# Patient Record
Sex: FEMALE | Race: WHITE
[De-identification: names, ages, dates, MRNs, and addresses within clinical notes are randomized per-mention and may not be internally consistent; named-entity substitution may affect disease eponyms.]

---

## 2020-06-29 ENCOUNTER — HOSPITAL ENCOUNTER (OUTPATIENT)
Age: 55
End: 2020-06-29
Payer: COMMERCIAL

## 2020-06-29 DIAGNOSIS — M51.37: ICD-10-CM

## 2020-06-29 DIAGNOSIS — M79.7: ICD-10-CM

## 2020-06-29 DIAGNOSIS — M06.4: Primary | ICD-10-CM

## 2020-06-29 LAB
ALANINE AMINOTRANSFER ALT/SGPT: 31 U/L (ref 13–56)
ALBUMIN SERPL-MCNC: 3.8 G/DL (ref 3.2–5)
ALKALINE PHOSPHATASE: 87 U/L (ref 45–117)
ANION GAP: 5 (ref 5–15)
AST(SGOT): 12 U/L (ref 15–37)
BUN SERPL-MCNC: 17 MG/DL (ref 7–18)
BUN/CREAT RATIO: 24.4 RATIO (ref 10–20)
CALCIUM SERPL-MCNC: 8.8 MG/DL (ref 8.5–10.1)
CARBON DIOXIDE: 27 MMOL/L (ref 21–32)
CHLORIDE: 105 MMOL/L (ref 98–107)
DEPRECATED RDW RBC: 46.1 FL (ref 35.1–43.9)
ERYTHROCYTE [DISTWIDTH] IN BLOOD: 13.4 % (ref 11.6–14.6)
EST GLOM FILT RATE - AFR AMER: 112 ML/MIN (ref 60–?)
GLOBULIN: 3.5 G/DL (ref 2.2–4.2)
GLUCOSE: 102 MG/DL (ref 74–106)
HCT VFR BLD AUTO: 45.1 % (ref 37–47)
HEMOGLOBIN: 14.2 G/DL (ref 12–15)
HGB BLD-MCNC: 14.2 G/DL (ref 12–15)
IMMATURE GRANULOCYTES COUNT: 0.04 X10^3/UL (ref 0–0)
MCV RBC: 93.6 FL (ref 81–99)
MEAN CORP HGB CONC: 31.5 G/DL (ref 32–36)
MEAN PLATELET VOL.: 9.6 FL (ref 6.2–12)
NRBC FLAGGED BY ANALYZER: 0 % (ref 0–5)
PLATELET # BLD: 337 K/MM3 (ref 150–450)
PLATELET COUNT: 337 K/MM3 (ref 150–450)
POTASSIUM: 3.9 MMOL/L (ref 3.5–5.1)
RBC # BLD AUTO: 4.82 M/MM3 (ref 4.2–5.4)
RBC DISTRIBUTION WIDTH CV: 13.4 % (ref 11.6–14.6)
RBC DISTRIBUTION WIDTH SD: 46.1 FL (ref 35.1–43.9)
WBC # BLD AUTO: 9.2 K/MM3 (ref 4.4–11)
WHITE BLOOD COUNT: 9.2 K/MM3 (ref 4.4–11)

## 2020-06-29 PROCEDURE — 85025 COMPLETE CBC W/AUTO DIFF WBC: CPT

## 2020-06-29 PROCEDURE — 36415 COLL VENOUS BLD VENIPUNCTURE: CPT

## 2020-06-29 PROCEDURE — 80053 COMPREHEN METABOLIC PANEL: CPT

## 2020-06-29 PROCEDURE — 86803 HEPATITIS C AB TEST: CPT

## 2020-06-29 PROCEDURE — 86200 CCP ANTIBODY: CPT

## 2020-06-29 PROCEDURE — 86706 HEP B SURFACE ANTIBODY: CPT

## 2020-06-29 PROCEDURE — 86705 HEP B CORE ANTIBODY IGM: CPT

## 2020-06-29 PROCEDURE — 87340 HEPATITIS B SURFACE AG IA: CPT

## 2020-06-29 PROCEDURE — 86431 RHEUMATOID FACTOR QUANT: CPT

## 2020-06-29 PROCEDURE — 81374 HLA I TYPING 1 ANTIGEN LR: CPT

## 2020-09-15 ENCOUNTER — HOSPITAL ENCOUNTER (OUTPATIENT)
Age: 55
End: 2020-09-15
Payer: COMMERCIAL

## 2020-09-15 DIAGNOSIS — M06.4: Primary | ICD-10-CM

## 2020-09-15 DIAGNOSIS — M79.7: ICD-10-CM

## 2020-09-15 DIAGNOSIS — M51.37: ICD-10-CM

## 2020-09-15 DIAGNOSIS — Z79.899: ICD-10-CM

## 2020-09-15 LAB
ALANINE AMINOTRANSFER ALT/SGPT: 35 U/L (ref 13–56)
ALBUMIN SERPL-MCNC: 3.7 G/DL (ref 3.2–5)
ALKALINE PHOSPHATASE: 78 U/L (ref 45–117)
ANION GAP: 5 (ref 5–15)
AST(SGOT): 16 U/L (ref 15–37)
BUN SERPL-MCNC: 12 MG/DL (ref 7–18)
BUN/CREAT RATIO: 15.4 RATIO (ref 10–20)
CALCIUM SERPL-MCNC: 8.8 MG/DL (ref 8.5–10.1)
CARBON DIOXIDE: 31 MMOL/L (ref 21–32)
CHLORIDE: 106 MMOL/L (ref 98–107)
DEPRECATED RDW RBC: 44.5 FL (ref 35.1–43.9)
ERYTHROCYTE [DISTWIDTH] IN BLOOD: 13 % (ref 11.6–14.6)
EST GLOM FILT RATE - AFR AMER: 98 ML/MIN (ref 60–?)
GLOBULIN: 3 G/DL (ref 2.2–4.2)
GLUCOSE: 106 MG/DL (ref 74–106)
HCT VFR BLD AUTO: 41 % (ref 37–47)
HEMOGLOBIN: 13 G/DL (ref 12–15)
HGB BLD-MCNC: 13 G/DL (ref 12–15)
IMMATURE GRANULOCYTES COUNT: 0.05 X10^3/UL (ref 0–0)
MCV RBC: 94.5 FL (ref 81–99)
MEAN CORP HGB CONC: 31.7 G/DL (ref 32–36)
MEAN PLATELET VOL.: 10.2 FL (ref 6.2–12)
NRBC FLAGGED BY ANALYZER: 0 % (ref 0–5)
PLATELET # BLD: 351 K/MM3 (ref 150–450)
PLATELET COUNT: 351 K/MM3 (ref 150–450)
POTASSIUM: 3.6 MMOL/L (ref 3.5–5.1)
RBC # BLD AUTO: 4.34 M/MM3 (ref 4.2–5.4)
RBC DISTRIBUTION WIDTH CV: 13 % (ref 11.6–14.6)
RBC DISTRIBUTION WIDTH SD: 44.5 FL (ref 35.1–43.9)
WBC # BLD AUTO: 9 K/MM3 (ref 4.4–11)
WHITE BLOOD COUNT: 9 K/MM3 (ref 4.4–11)

## 2020-09-15 PROCEDURE — 85025 COMPLETE CBC W/AUTO DIFF WBC: CPT

## 2020-09-15 PROCEDURE — 36415 COLL VENOUS BLD VENIPUNCTURE: CPT

## 2020-09-15 PROCEDURE — 80053 COMPREHEN METABOLIC PANEL: CPT

## 2020-10-20 ENCOUNTER — HOSPITAL ENCOUNTER (OUTPATIENT)
Age: 55
End: 2020-10-20
Payer: COMMERCIAL

## 2020-10-20 DIAGNOSIS — M51.37: ICD-10-CM

## 2020-10-20 DIAGNOSIS — M06.4: Primary | ICD-10-CM

## 2020-10-20 DIAGNOSIS — Z79.899: ICD-10-CM

## 2020-10-20 DIAGNOSIS — M79.7: ICD-10-CM

## 2020-10-20 LAB
ALANINE AMINOTRANSFER ALT/SGPT: 33 U/L (ref 13–56)
ALBUMIN SERPL-MCNC: 3.8 G/DL (ref 3.2–5)
ALKALINE PHOSPHATASE: 74 U/L (ref 45–117)
ANION GAP: 5 (ref 5–15)
AST(SGOT): 12 U/L (ref 15–37)
BUN SERPL-MCNC: 16 MG/DL (ref 7–18)
BUN/CREAT RATIO: 19.9 RATIO (ref 10–20)
CALCIUM SERPL-MCNC: 9 MG/DL (ref 8.5–10.1)
CARBON DIOXIDE: 30 MMOL/L (ref 21–32)
CHLORIDE: 103 MMOL/L (ref 98–107)
DEPRECATED RDW RBC: 46.6 FL (ref 35.1–43.9)
ERYTHROCYTE [DISTWIDTH] IN BLOOD: 13.6 % (ref 11.6–14.6)
EST GLOM FILT RATE - AFR AMER: 95 ML/MIN (ref 60–?)
GLOBULIN: 3.3 G/DL (ref 2.2–4.2)
GLUCOSE: 101 MG/DL (ref 74–106)
HCT VFR BLD AUTO: 42.3 % (ref 37–47)
HEMOGLOBIN: 13.6 G/DL (ref 12–15)
HGB BLD-MCNC: 13.6 G/DL (ref 12–15)
IMMATURE GRANULOCYTES COUNT: 0.05 X10^3/UL (ref 0–0)
MCV RBC: 95.1 FL (ref 81–99)
MEAN CORP HGB CONC: 32.2 G/DL (ref 32–36)
MEAN PLATELET VOL.: 9.9 FL (ref 6.2–12)
NRBC FLAGGED BY ANALYZER: 0 % (ref 0–5)
PLATELET # BLD: 366 K/MM3 (ref 150–450)
PLATELET COUNT: 366 K/MM3 (ref 150–450)
POTASSIUM: 3.8 MMOL/L (ref 3.5–5.1)
RBC # BLD AUTO: 4.45 M/MM3 (ref 4.2–5.4)
RBC DISTRIBUTION WIDTH CV: 13.6 % (ref 11.6–14.6)
RBC DISTRIBUTION WIDTH SD: 46.6 FL (ref 35.1–43.9)
WBC # BLD AUTO: 11 K/MM3 (ref 4.4–11)
WHITE BLOOD COUNT: 11 K/MM3 (ref 4.4–11)

## 2020-10-20 PROCEDURE — 80053 COMPREHEN METABOLIC PANEL: CPT

## 2020-10-20 PROCEDURE — 36415 COLL VENOUS BLD VENIPUNCTURE: CPT

## 2020-10-20 PROCEDURE — 85025 COMPLETE CBC W/AUTO DIFF WBC: CPT

## 2020-12-22 ENCOUNTER — HOSPITAL ENCOUNTER (OUTPATIENT)
Age: 55
End: 2020-12-22
Payer: COMMERCIAL

## 2020-12-22 DIAGNOSIS — M51.37: ICD-10-CM

## 2020-12-22 DIAGNOSIS — M06.4: Primary | ICD-10-CM

## 2020-12-22 DIAGNOSIS — Z79.899: ICD-10-CM

## 2020-12-22 DIAGNOSIS — M79.7: ICD-10-CM

## 2020-12-22 LAB
ALANINE AMINOTRANSFER ALT/SGPT: 42 U/L (ref 13–56)
ALBUMIN SERPL-MCNC: 3.9 G/DL (ref 3.2–5)
ALKALINE PHOSPHATASE: 64 U/L (ref 45–117)
ANION GAP: 5 (ref 5–15)
AST(SGOT): 15 U/L (ref 15–37)
BUN SERPL-MCNC: 13 MG/DL (ref 7–18)
BUN/CREAT RATIO: 15 RATIO (ref 10–20)
CALCIUM SERPL-MCNC: 8.8 MG/DL (ref 8.5–10.1)
CARBON DIOXIDE: 30 MMOL/L (ref 21–32)
CHLORIDE: 103 MMOL/L (ref 98–107)
DEPRECATED RDW RBC: 45.5 FL (ref 35.1–43.9)
ERYTHROCYTE [DISTWIDTH] IN BLOOD: 13.3 % (ref 11.6–14.6)
EST GLOM FILT RATE - AFR AMER: 87 ML/MIN (ref 60–?)
GLOBULIN: 3 G/DL (ref 2.2–4.2)
GLUCOSE: 98 MG/DL (ref 74–106)
HCT VFR BLD AUTO: 43.4 % (ref 37–47)
HEMOGLOBIN: 14.1 G/DL (ref 12–15)
HGB BLD-MCNC: 14.1 G/DL (ref 12–15)
IMMATURE GRANULOCYTES COUNT: 0.02 X10^3/UL (ref 0–0)
MCV RBC: 93.5 FL (ref 81–99)
MEAN CORP HGB CONC: 32.5 G/DL (ref 32–36)
MEAN PLATELET VOL.: 10.3 FL (ref 6.2–12)
NRBC FLAGGED BY ANALYZER: 0 % (ref 0–5)
PLATELET # BLD: 302 K/MM3 (ref 150–450)
PLATELET COUNT: 302 K/MM3 (ref 150–450)
POTASSIUM: 3.5 MMOL/L (ref 3.5–5.1)
RBC # BLD AUTO: 4.64 M/MM3 (ref 4.2–5.4)
RBC DISTRIBUTION WIDTH CV: 13.3 % (ref 11.6–14.6)
RBC DISTRIBUTION WIDTH SD: 45.5 FL (ref 35.1–43.9)
WBC # BLD AUTO: 8.2 K/MM3 (ref 4.4–11)
WHITE BLOOD COUNT: 8.2 K/MM3 (ref 4.4–11)

## 2020-12-22 PROCEDURE — 36415 COLL VENOUS BLD VENIPUNCTURE: CPT

## 2020-12-22 PROCEDURE — 80053 COMPREHEN METABOLIC PANEL: CPT

## 2020-12-22 PROCEDURE — 85025 COMPLETE CBC W/AUTO DIFF WBC: CPT

## 2023-05-19 ENCOUNTER — HOSPITAL ENCOUNTER (OUTPATIENT)
Dept: DATA CONVERSION | Facility: HOSPITAL | Age: 58
End: 2023-05-19
Attending: PAIN MEDICINE | Admitting: PAIN MEDICINE
Payer: COMMERCIAL

## 2023-05-19 DIAGNOSIS — M54.16 RADICULOPATHY, LUMBAR REGION: ICD-10-CM

## 2023-05-19 DIAGNOSIS — R52 PAIN, UNSPECIFIED: ICD-10-CM

## 2023-07-05 LAB
ALANINE AMINOTRANSFERASE (SGPT) (U/L) IN SER/PLAS: 29 U/L (ref 7–45)
ALBUMIN (G/DL) IN SER/PLAS: 4.3 G/DL (ref 3.4–5)
ALKALINE PHOSPHATASE (U/L) IN SER/PLAS: 64 U/L (ref 33–110)
ANION GAP IN SER/PLAS: 8 MMOL/L (ref 10–20)
ASPARTATE AMINOTRANSFERASE (SGOT) (U/L) IN SER/PLAS: 20 U/L (ref 9–39)
BASOPHILS (10*3/UL) IN BLOOD BY AUTOMATED COUNT: 0.04 X10E9/L (ref 0–0.1)
BASOPHILS/100 LEUKOCYTES IN BLOOD BY AUTOMATED COUNT: 0.6 % (ref 0–2)
BILIRUBIN TOTAL (MG/DL) IN SER/PLAS: 0.6 MG/DL (ref 0–1.2)
CALCIUM (MG/DL) IN SER/PLAS: 9.4 MG/DL (ref 8.6–10.3)
CARBON DIOXIDE, TOTAL (MMOL/L) IN SER/PLAS: 29 MMOL/L (ref 21–32)
CHLORIDE (MMOL/L) IN SER/PLAS: 107 MMOL/L (ref 98–107)
CHOLESTEROL (MG/DL) IN SER/PLAS: 110 MG/DL (ref 0–199)
CHOLESTEROL IN HDL (MG/DL) IN SER/PLAS: 42 MG/DL
CHOLESTEROL/HDL RATIO: 2.6
CREATININE (MG/DL) IN SER/PLAS: 0.78 MG/DL (ref 0.5–1.05)
EOSINOPHILS (10*3/UL) IN BLOOD BY AUTOMATED COUNT: 0.15 X10E9/L (ref 0–0.7)
EOSINOPHILS/100 LEUKOCYTES IN BLOOD BY AUTOMATED COUNT: 2.3 % (ref 0–6)
ERYTHROCYTE DISTRIBUTION WIDTH (RATIO) BY AUTOMATED COUNT: 12.5 % (ref 11.5–14.5)
ERYTHROCYTE MEAN CORPUSCULAR HEMOGLOBIN CONCENTRATION (G/DL) BY AUTOMATED: 32.6 G/DL (ref 32–36)
ERYTHROCYTE MEAN CORPUSCULAR VOLUME (FL) BY AUTOMATED COUNT: 90 FL (ref 80–100)
ERYTHROCYTES (10*6/UL) IN BLOOD BY AUTOMATED COUNT: 4.71 X10E12/L (ref 4–5.2)
ESTIMATED AVERAGE GLUCOSE FOR HBA1C: 154 MG/DL
GFR FEMALE: 88 ML/MIN/1.73M2
GLUCOSE (MG/DL) IN SER/PLAS: 145 MG/DL (ref 74–99)
HEMATOCRIT (%) IN BLOOD BY AUTOMATED COUNT: 42.3 % (ref 36–46)
HEMOGLOBIN (G/DL) IN BLOOD: 13.8 G/DL (ref 12–16)
HEMOGLOBIN A1C/HEMOGLOBIN TOTAL IN BLOOD: 7 %
IMMATURE GRANULOCYTES/100 LEUKOCYTES IN BLOOD BY AUTOMATED COUNT: 0.2 % (ref 0–0.9)
LDL: 46 MG/DL (ref 0–99)
LEUKOCYTES (10*3/UL) IN BLOOD BY AUTOMATED COUNT: 6.7 X10E9/L (ref 4.4–11.3)
LYMPHOCYTES (10*3/UL) IN BLOOD BY AUTOMATED COUNT: 2.79 X10E9/L (ref 1.2–4.8)
LYMPHOCYTES/100 LEUKOCYTES IN BLOOD BY AUTOMATED COUNT: 41.9 % (ref 13–44)
MONOCYTES (10*3/UL) IN BLOOD BY AUTOMATED COUNT: 0.47 X10E9/L (ref 0.1–1)
MONOCYTES/100 LEUKOCYTES IN BLOOD BY AUTOMATED COUNT: 7.1 % (ref 2–10)
NEUTROPHILS (10*3/UL) IN BLOOD BY AUTOMATED COUNT: 3.2 X10E9/L (ref 1.2–7.7)
NEUTROPHILS/100 LEUKOCYTES IN BLOOD BY AUTOMATED COUNT: 47.9 % (ref 40–80)
PLATELETS (10*3/UL) IN BLOOD AUTOMATED COUNT: 260 X10E9/L (ref 150–450)
POTASSIUM (MMOL/L) IN SER/PLAS: 5.1 MMOL/L (ref 3.5–5.3)
PROTEIN TOTAL: 6.4 G/DL (ref 6.4–8.2)
SODIUM (MMOL/L) IN SER/PLAS: 139 MMOL/L (ref 136–145)
THYROTROPIN (MIU/L) IN SER/PLAS BY DETECTION LIMIT <= 0.05 MIU/L: 1.62 MIU/L (ref 0.44–3.98)
TRIGLYCERIDE (MG/DL) IN SER/PLAS: 109 MG/DL (ref 0–149)
UREA NITROGEN (MG/DL) IN SER/PLAS: 13 MG/DL (ref 6–23)
VLDL: 22 MG/DL (ref 0–40)

## 2023-07-06 ENCOUNTER — TELEPHONE (OUTPATIENT)
Dept: PRIMARY CARE | Facility: CLINIC | Age: 58
End: 2023-07-06

## 2023-07-06 NOTE — TELEPHONE ENCOUNTER
----- Message from Rosales Zamarripa PA-C sent at 7/6/2023  7:08 AM EDT -----  Please let her know her labs are all stable. Look forward to seeing her soon. Thanks!

## 2023-07-07 RX ORDER — METFORMIN HYDROCHLORIDE 500 MG/1
500 TABLET, EXTENDED RELEASE ORAL DAILY
COMMUNITY
End: 2023-07-10 | Stop reason: SDUPTHER

## 2023-07-07 RX ORDER — PREGABALIN 100 MG/1
100 CAPSULE ORAL 2 TIMES DAILY
COMMUNITY
End: 2023-12-14 | Stop reason: SDUPTHER

## 2023-07-07 RX ORDER — PANTOPRAZOLE SODIUM 40 MG/1
40 TABLET, DELAYED RELEASE ORAL DAILY
COMMUNITY
End: 2023-09-12 | Stop reason: SDUPTHER

## 2023-07-07 RX ORDER — ROSUVASTATIN CALCIUM 5 MG/1
5 TABLET, COATED ORAL DAILY
COMMUNITY
End: 2023-07-10 | Stop reason: SDUPTHER

## 2023-07-07 RX ORDER — LANOLIN ALCOHOL/MO/W.PET/CERES
100 CREAM (GRAM) TOPICAL DAILY
COMMUNITY
End: 2023-09-12

## 2023-07-07 RX ORDER — SERTRALINE HYDROCHLORIDE 50 MG/1
50 TABLET, FILM COATED ORAL DAILY
COMMUNITY
End: 2023-09-12 | Stop reason: ALTCHOICE

## 2023-07-07 RX ORDER — SERTRALINE HYDROCHLORIDE 25 MG/1
25 TABLET, FILM COATED ORAL DAILY
COMMUNITY
End: 2023-09-12 | Stop reason: SDUPTHER

## 2023-07-07 RX ORDER — ASCORBIC ACID 125 MG
1 TABLET,CHEWABLE ORAL DAILY
COMMUNITY

## 2023-07-10 ENCOUNTER — OFFICE VISIT (OUTPATIENT)
Dept: PRIMARY CARE | Facility: CLINIC | Age: 58
End: 2023-07-10
Payer: COMMERCIAL

## 2023-07-10 ENCOUNTER — TELEPHONE (OUTPATIENT)
Dept: PRIMARY CARE | Facility: CLINIC | Age: 58
End: 2023-07-10

## 2023-07-10 VITALS
SYSTOLIC BLOOD PRESSURE: 140 MMHG | DIASTOLIC BLOOD PRESSURE: 80 MMHG | WEIGHT: 176 LBS | HEIGHT: 63 IN | HEART RATE: 81 BPM | BODY MASS INDEX: 31.18 KG/M2

## 2023-07-10 DIAGNOSIS — K21.9 GASTROESOPHAGEAL REFLUX DISEASE WITHOUT ESOPHAGITIS: ICD-10-CM

## 2023-07-10 DIAGNOSIS — M54.41 CHRONIC LOW BACK PAIN WITH BILATERAL SCIATICA, UNSPECIFIED BACK PAIN LATERALITY: ICD-10-CM

## 2023-07-10 DIAGNOSIS — G25.81 RESTLESS LEG SYNDROME: ICD-10-CM

## 2023-07-10 DIAGNOSIS — E78.5 HYPERLIPIDEMIA, UNSPECIFIED HYPERLIPIDEMIA TYPE: Primary | ICD-10-CM

## 2023-07-10 DIAGNOSIS — M54.42 CHRONIC LOW BACK PAIN WITH BILATERAL SCIATICA, UNSPECIFIED BACK PAIN LATERALITY: ICD-10-CM

## 2023-07-10 DIAGNOSIS — E11.9 TYPE 2 DIABETES MELLITUS WITHOUT COMPLICATION, UNSPECIFIED WHETHER LONG TERM INSULIN USE (MULTI): ICD-10-CM

## 2023-07-10 DIAGNOSIS — G89.29 CHRONIC LOW BACK PAIN WITH BILATERAL SCIATICA, UNSPECIFIED BACK PAIN LATERALITY: ICD-10-CM

## 2023-07-10 DIAGNOSIS — Z12.31 ENCOUNTER FOR SCREENING MAMMOGRAM FOR BREAST CANCER: ICD-10-CM

## 2023-07-10 LAB — VITAMIN B6: 571.4 NMOL/L (ref 20–125)

## 2023-07-10 PROCEDURE — 99214 OFFICE O/P EST MOD 30 MIN: CPT

## 2023-07-10 PROCEDURE — 3051F HG A1C>EQUAL 7.0%<8.0%: CPT

## 2023-07-10 PROCEDURE — 3079F DIAST BP 80-89 MM HG: CPT

## 2023-07-10 PROCEDURE — 3077F SYST BP >= 140 MM HG: CPT

## 2023-07-10 PROCEDURE — 1036F TOBACCO NON-USER: CPT

## 2023-07-10 PROCEDURE — 3008F BODY MASS INDEX DOCD: CPT

## 2023-07-10 RX ORDER — IBUPROFEN 200 MG
1 CAPSULE ORAL DAILY
COMMUNITY

## 2023-07-10 RX ORDER — ROPINIROLE 0.25 MG/1
TABLET, FILM COATED ORAL
Qty: 255 TABLET | Refills: 0 | Status: SHIPPED | OUTPATIENT
Start: 2023-07-10 | End: 2023-09-12 | Stop reason: SDUPTHER

## 2023-07-10 RX ORDER — ROSUVASTATIN CALCIUM 5 MG/1
5 TABLET, COATED ORAL DAILY
Qty: 90 TABLET | Refills: 3 | Status: SHIPPED | OUTPATIENT
Start: 2023-07-10 | End: 2023-09-12 | Stop reason: SDUPTHER

## 2023-07-10 RX ORDER — METFORMIN HYDROCHLORIDE 500 MG/1
500 TABLET, EXTENDED RELEASE ORAL DAILY
Qty: 90 TABLET | Refills: 3 | Status: SHIPPED | OUTPATIENT
Start: 2023-07-10 | End: 2023-09-12 | Stop reason: SDUPTHER

## 2023-07-10 ASSESSMENT — PATIENT HEALTH QUESTIONNAIRE - PHQ9
2. FEELING DOWN, DEPRESSED OR HOPELESS: NOT AT ALL
SUM OF ALL RESPONSES TO PHQ9 QUESTIONS 1 AND 2: 0
1. LITTLE INTEREST OR PLEASURE IN DOING THINGS: NOT AT ALL

## 2023-07-10 ASSESSMENT — ENCOUNTER SYMPTOMS
RESPIRATORY NEGATIVE: 1
GASTROINTESTINAL NEGATIVE: 1
CARDIOVASCULAR NEGATIVE: 1
CONSTITUTIONAL NEGATIVE: 1

## 2023-07-10 NOTE — PROGRESS NOTES
"Subjective   Patient ID: Hallie Demarco is a 57 y.o. female who presents for pt having issues with leg muscle restlessness at night.    HPI   DM2: A1C 7% 7/5/23. Compliant, no SE. Blood sugar at home has been averaging 130s.  GERD: PPI effective, sometimes breakthrough heartburn. She avoids trigger foods/drinks, HOB is raised. B12 level good 9/9/22.   HYPERLIPIDEMIA: Lipids WNL 7/5/23. Compliant with statin, no SE.  BL HIP/BACK PAIN/BL LE NEUROPATHY SYMPTOMS: Fall on ice 2018, still w/residual pain/symptoms. Following w/Dr. Carr's office, receives injections. Has seen neurosurgeon Dr. Wright and does not think that neuropathy symptoms are from spine, EMG unremarkable June 2023. Dr. Wright thinks possible restless leg. Endorses nightly leg spasms and tingling with some mild burning BL LE, getting into hot back seems to be the only relief.  ANXIETY/DEPRESSION: Compliant w/sertraline 75mg daily. Stable. DANNY score 7/PHQ score 9 today.    Hx of tx by Dr. Faria in [place] ([facility]) for non-RA factor positive RA. Then was told by another rheumatologist in [place] which said she did not have RA.      BP a little elevated today, cannot remember home checks, denies CP/SOB/dizziness.     Dermatology, sees Dejan at [facility].  Hx of cystocele, s/p hysterectomy. Women's health with Dr. Hope.  Hx of vocal cord caner, follows with Dr. Sutherland, riaz.  Mammogram good 2022, due now.    Review of Systems   Constitutional: Negative.    Respiratory: Negative.     Cardiovascular: Negative.    Gastrointestinal: Negative.        Objective   /80   Pulse 81   Ht 1.6 m (5' 3\")   Wt 79.8 kg (176 lb)   BMI 31.18 kg/m²     Physical Exam  Constitutional:       General: She is not in acute distress.     Appearance: Normal appearance. She is not ill-appearing.   HENT:      Head: Normocephalic and atraumatic.   Eyes:      Extraocular Movements: Extraocular movements intact.      Conjunctiva/sclera: Conjunctivae normal. "   Cardiovascular:      Rate and Rhythm: Normal rate.   Pulmonary:      Effort: Pulmonary effort is normal.   Abdominal:      General: There is no distension.   Musculoskeletal:         General: Normal range of motion.      Cervical back: Normal range of motion.   Skin:     General: Skin is warm and dry.   Neurological:      General: No focal deficit present.      Mental Status: She is alert and oriented to person, place, and time.   Psychiatric:         Mood and Affect: Mood normal.         Behavior: Behavior normal.         Thought Content: Thought content normal.         Judgment: Judgment normal.         Assessment/Plan        Advised track daily BP and bring log to follow up.  Start Requip for restless leg, taper up to 1mg nightly, she may call me in 1 month and will increase if not effecitve.  No other medication changes.  Mammogram ordered.  Follow up 2 months or sooner prn.

## 2023-07-10 NOTE — TELEPHONE ENCOUNTER
----- Message from Rosales Zamarripa PA-C sent at 7/10/2023  3:16 PM EDT -----  Please let her know I would like her to stop taking her B12 as well as her B6. I would like to see how she does with the new medication we started today and I will recheck levels at follow up. Thanks!

## 2023-07-20 ENCOUNTER — TELEPHONE (OUTPATIENT)
Dept: PRIMARY CARE | Facility: CLINIC | Age: 58
End: 2023-07-20

## 2023-07-20 DIAGNOSIS — R92.8 ABNORMAL MAMMOGRAM OF LEFT BREAST: Primary | ICD-10-CM

## 2023-07-20 NOTE — TELEPHONE ENCOUNTER
----- Message from Rosales Zamarripa PA-C sent at 7/20/2023  8:43 AM EDT -----  Please let her know there was an abnormal spot in her left breast on mammogram. These are usually benign, but we need to get an US to further characterize what's there. Thanks!

## 2023-07-31 ENCOUNTER — TELEPHONE (OUTPATIENT)
Dept: PRIMARY CARE | Facility: CLINIC | Age: 58
End: 2023-07-31

## 2023-07-31 NOTE — TELEPHONE ENCOUNTER
----- Message from Rosales Zamarripa PA-C sent at 2023  9:21 AM EDT -----  Please let her know her US looks fine, the radiologist would like to have a diagnostic mammogram done in 6 months from now to be completely sure. There is an order already in the system for a diagnostic mammogram which will  24, so she will need to schedule this mammogram before then. Thanks!

## 2023-09-07 VITALS — BODY MASS INDEX: 31.32 KG/M2 | WEIGHT: 170.19 LBS | HEIGHT: 62 IN

## 2023-09-12 ENCOUNTER — LAB (OUTPATIENT)
Dept: LAB | Facility: LAB | Age: 58
End: 2023-09-12
Payer: COMMERCIAL

## 2023-09-12 ENCOUNTER — OFFICE VISIT (OUTPATIENT)
Dept: PRIMARY CARE | Facility: CLINIC | Age: 58
End: 2023-09-12
Payer: COMMERCIAL

## 2023-09-12 VITALS
BODY MASS INDEX: 32.25 KG/M2 | SYSTOLIC BLOOD PRESSURE: 132 MMHG | DIASTOLIC BLOOD PRESSURE: 80 MMHG | HEIGHT: 63 IN | HEART RATE: 59 BPM | WEIGHT: 182 LBS

## 2023-09-12 DIAGNOSIS — G62.9 NEUROPATHY: ICD-10-CM

## 2023-09-12 DIAGNOSIS — R79.89 LOW VITAMIN B12 LEVEL: ICD-10-CM

## 2023-09-12 DIAGNOSIS — E11.9 TYPE 2 DIABETES MELLITUS WITHOUT COMPLICATION, UNSPECIFIED WHETHER LONG TERM INSULIN USE (MULTI): ICD-10-CM

## 2023-09-12 DIAGNOSIS — G25.81 RESTLESS LEG SYNDROME: ICD-10-CM

## 2023-09-12 DIAGNOSIS — R60.9 EDEMA, UNSPECIFIED TYPE: Primary | ICD-10-CM

## 2023-09-12 DIAGNOSIS — F41.9 ANXIETY: ICD-10-CM

## 2023-09-12 DIAGNOSIS — E78.5 HYPERLIPIDEMIA, UNSPECIFIED HYPERLIPIDEMIA TYPE: ICD-10-CM

## 2023-09-12 DIAGNOSIS — K21.9 GASTROESOPHAGEAL REFLUX DISEASE WITHOUT ESOPHAGITIS: ICD-10-CM

## 2023-09-12 LAB — COBALAMIN (VITAMIN B12) (PG/ML) IN SER/PLAS: 525 PG/ML (ref 211–911)

## 2023-09-12 PROCEDURE — 3051F HG A1C>EQUAL 7.0%<8.0%: CPT

## 2023-09-12 PROCEDURE — 84207 ASSAY OF VITAMIN B-6: CPT

## 2023-09-12 PROCEDURE — 82607 VITAMIN B-12: CPT

## 2023-09-12 PROCEDURE — 3075F SYST BP GE 130 - 139MM HG: CPT

## 2023-09-12 PROCEDURE — 1036F TOBACCO NON-USER: CPT

## 2023-09-12 PROCEDURE — 36415 COLL VENOUS BLD VENIPUNCTURE: CPT

## 2023-09-12 PROCEDURE — 3079F DIAST BP 80-89 MM HG: CPT

## 2023-09-12 PROCEDURE — 99214 OFFICE O/P EST MOD 30 MIN: CPT

## 2023-09-12 RX ORDER — PANTOPRAZOLE SODIUM 40 MG/1
40 TABLET, DELAYED RELEASE ORAL DAILY
Qty: 90 TABLET | Refills: 3 | Status: SHIPPED | OUTPATIENT
Start: 2023-09-12 | End: 2024-02-16 | Stop reason: SDUPTHER

## 2023-09-12 RX ORDER — ROSUVASTATIN CALCIUM 5 MG/1
5 TABLET, COATED ORAL DAILY
Qty: 90 TABLET | Refills: 3 | Status: SHIPPED | OUTPATIENT
Start: 2023-09-12 | End: 2024-02-16 | Stop reason: SDUPTHER

## 2023-09-12 RX ORDER — SERTRALINE HYDROCHLORIDE 50 MG/1
50 TABLET, FILM COATED ORAL DAILY
Qty: 90 TABLET | Refills: 3 | Status: CANCELLED | OUTPATIENT
Start: 2023-09-12 | End: 2024-09-11

## 2023-09-12 RX ORDER — METFORMIN HYDROCHLORIDE 500 MG/1
500 TABLET, EXTENDED RELEASE ORAL DAILY
Qty: 90 TABLET | Refills: 3 | Status: SHIPPED | OUTPATIENT
Start: 2023-09-12 | End: 2024-02-16 | Stop reason: SDUPTHER

## 2023-09-12 RX ORDER — SERTRALINE HYDROCHLORIDE 25 MG/1
25 TABLET, FILM COATED ORAL DAILY
Qty: 90 TABLET | Refills: 3 | Status: SHIPPED | OUTPATIENT
Start: 2023-09-12 | End: 2024-02-16 | Stop reason: SDUPTHER

## 2023-09-12 RX ORDER — ROPINIROLE 2 MG/1
2 TABLET, FILM COATED ORAL NIGHTLY
Qty: 60 TABLET | Refills: 1 | Status: SHIPPED | OUTPATIENT
Start: 2023-09-12 | End: 2023-11-16 | Stop reason: SDUPTHER

## 2023-09-12 ASSESSMENT — ENCOUNTER SYMPTOMS
CARDIOVASCULAR NEGATIVE: 1
RESPIRATORY NEGATIVE: 1
CONSTITUTIONAL NEGATIVE: 1
GASTROINTESTINAL NEGATIVE: 1

## 2023-09-12 NOTE — PATIENT INSTRUCTIONS

## 2023-09-12 NOTE — PROGRESS NOTES
"Subjective   Patient ID: Hallie Demarco is a 58 y.o. female who presents for 2 month medcheck,pt c/o  b/l ankle swelling.    HPI   DM2: A1C 7% 7/5/23. Compliant, no SE. Blood sugar at home has been averaging 130s.  GERD: PPI effective, sometimes breakthrough heartburn. She avoids trigger foods/drinks, HOB is raised. B12 level good 9/9/22.   HYPERLIPIDEMIA: Lipids WNL 7/5/23. Compliant with statin, no SE.  BL HIP/BACK PAIN/BL LE NEUROPATHY SYMPTOMS: Fall on ice 2018, still w/residual pain/symptoms. Following with pain mgmt, receives injections. Has seen neurosurgeon Dr. Wright and does not think that neuropathy symptoms are from spine, EMG unremarkable June 2023. Dr. Wright thinks possible restless leg. Endorses nightly leg spasms and tingling with some mild burning BL LE, getting into hot bath helps, Requip 1mg has been helpful, does not completely resolve symptoms. Lyrica only somewhat effective.  ANXIETY/DEPRESSION: Compliant w/sertraline 75mg daily. But feels has improved greatly, would like to go down to 25mg.     Hx of tx by Dr. Faria in [place] ([facility]) for non-RA factor positive RA. Then was told by another rheumatologist in [place] which said she did not have RA.      BP a little elevated today, brought in log and averaging <130/80 at home, denies CP/SOB/dizziness.     Dermatology, sees Dejan at [facility].  Hx of cystocele, s/p hysterectomy. Women's health with Dr. Hope.  Hx of vocal cord caner, follows with riaz Azevedo. Next appt Jan 2024.  Mammogram showed asymmetry 7/2023, US showed prob benign, will have more imaging this coming January.   Colonoscopy good 2015, due again 2025.    Review of Systems   Constitutional: Negative.    Respiratory: Negative.     Cardiovascular: Negative.    Gastrointestinal: Negative.        Objective   /80   Pulse 59   Ht 1.6 m (5' 3\")   Wt 82.6 kg (182 lb)   BMI 32.24 kg/m²     Physical Exam  Constitutional:       General: She is not in acute distress.    "  Appearance: Normal appearance. She is not ill-appearing.   HENT:      Head: Normocephalic and atraumatic.   Eyes:      Extraocular Movements: Extraocular movements intact.      Conjunctiva/sclera: Conjunctivae normal.   Cardiovascular:      Rate and Rhythm: Normal rate.   Pulmonary:      Effort: Pulmonary effort is normal.   Abdominal:      General: There is no distension.   Musculoskeletal:         General: Swelling present. Normal range of motion.      Cervical back: Normal range of motion.      Comments: BL ankle edema, nonpitting   Skin:     General: Skin is warm and dry.   Neurological:      General: No focal deficit present.      Mental Status: She is alert and oriented to person, place, and time.   Psychiatric:         Mood and Affect: Mood normal.         Behavior: Behavior normal.         Thought Content: Thought content normal.         Judgment: Judgment normal.         Assessment/Plan        Decrease sertraline to 25mg daily.  Increase Requip to 2mg nightly.  No other medication changes today.  Order given for compression stockings prn edema.  Recheck B12/B6 today.  Follow up 2 months or sooner prn.  Patient was identified as a fall risk. Risk prevention instructions provided.

## 2023-09-18 LAB — VITAMIN B6: 77.1 NMOL/L (ref 20–125)

## 2023-09-19 DIAGNOSIS — M54.16 LUMBAR NEURITIS: Primary | ICD-10-CM

## 2023-09-29 ENCOUNTER — DOCUMENTATION (OUTPATIENT)
Dept: PRIMARY CARE | Facility: CLINIC | Age: 58
End: 2023-09-29
Payer: MEDICARE

## 2023-10-02 NOTE — OP NOTE
PROCEDURE DETAILS    Preoperative Diagnosis:  Radiculopathy of lumbar region, M54.16    Postoperative Diagnosis:  Radiculopathy of lumbar region, M54.16    Surgeon: Humza Carr  Resident/Fellow/Other Assistant: None of these were associated with this case    Procedure:  1. L4-5 KARSTEN    Anesthesia: No anesthesiologist associated with this case  Estimated Blood Loss: 0  Findings: NA  Additional Details: The patient has a greater than 2-month history of severe low back and leg pain.  The patient has previously had 6 weeks of conservative management with exercise therapy  and medications.  The patient is compliant with home exercises for this issue.  The pain significantly interrupts the patient's physical function.  The patient does not desire spine surgery.  The patient had undergone a previous epidural injection and  obtained greater than 50% pain relief and functional improvement for 3 months.  She cannot sleep for 4 hours or walk for 1/4 mile due to the pain.  Her imaging is notable for right-sided neuroforaminal stenosis at L4-L5.        Operative Report:   Procedure: Interlaminar lumbar epidural steroid injection under fluoroscopic guidance at the L4-L5 interspace  Diagnosis: Lumbar radiculopathy  Solution: 1 mL of Kenalog 40 mg, 2 mL of lidocaine 2%, 5 mL normal saline, 8 mL total volume  Total contrast: 2 mL Omnipaque  Anesthesia: Local  Complications: None    After informed consent was obtained, the patient was brought to the OR and placed in the prone position. The area in question was prepped and draped  in sterile fashion. An AP fluoroscopic view of the lumbar spine was obtained and after 5 mL of lidocaine 1% was injected into the skin, a 17-gauge Touhy needle was inserted into the skin and advanced toward the L4-L5 interspace under intermittent fluoroscopic  guidance. The epidural space was identified via loss of resistance to air. Proper needle position was confirmed by AP and lateral  fluoroscopy. Contrast was administered under live fluoroscopy and demonstrated appropriate epidural uptake and the absence  of any intravascular or intrathecal spread. The local anesthetic steroid solution was then injected incrementally. The needle was removed. Bleeding was minimal. The patient tolerated the procedure well and was transferred to the recovery room in good  condition.                        Attestation:   Note Completion:  Attending Attestation I performed the procedure without a resident         Electronic Signatures:  Humza Carr)  (Signed 19-May-2023 20:41)   Authored: Post-Operative Note, Chart Review, Note Completion      Last Updated: 19-May-2023 20:41 by Humza Carr)

## 2023-10-03 DIAGNOSIS — M54.16 LUMBAR RADICULOPATHY: Primary | ICD-10-CM

## 2023-10-03 PROBLEM — R20.2 NUMBNESS AND TINGLING: Status: ACTIVE | Noted: 2023-10-03

## 2023-10-03 PROBLEM — F32.A DEPRESSION: Status: ACTIVE | Noted: 2023-10-03

## 2023-10-03 PROBLEM — K21.9 CHRONIC GERD: Status: ACTIVE | Noted: 2023-10-03

## 2023-10-03 PROBLEM — E53.8 VITAMIN B12 DEFICIENCY: Status: ACTIVE | Noted: 2023-10-03

## 2023-10-03 PROBLEM — M76.899 HAMSTRING TENDINITIS AT ORIGIN: Status: ACTIVE | Noted: 2023-10-03

## 2023-10-03 PROBLEM — R10.2 PELVIC PAIN IN FEMALE: Status: ACTIVE | Noted: 2023-10-03

## 2023-10-03 PROBLEM — G89.4 CHRONIC PAIN DISORDER: Status: ACTIVE | Noted: 2023-10-03

## 2023-10-03 PROBLEM — K21.9 LPRD (LARYNGOPHARYNGEAL REFLUX DISEASE): Status: ACTIVE | Noted: 2017-01-25

## 2023-10-03 PROBLEM — M51.369 BULGING LUMBAR DISC: Status: ACTIVE | Noted: 2023-10-03

## 2023-10-03 PROBLEM — R20.0 NUMBNESS AND TINGLING: Status: ACTIVE | Noted: 2023-10-03

## 2023-10-03 PROBLEM — N81.10 FEMALE CYSTOCELE: Status: ACTIVE | Noted: 2023-10-03

## 2023-10-03 PROBLEM — M51.26 LUMBAR DISC HERNIATION: Status: ACTIVE | Noted: 2023-10-03

## 2023-10-03 PROBLEM — J30.9 ALLERGIC RHINITIS: Status: ACTIVE | Noted: 2023-10-03

## 2023-10-03 PROBLEM — E66.811 OBESITY, CLASS I, BMI 30-34.9: Status: ACTIVE | Noted: 2018-10-17

## 2023-10-03 PROBLEM — M16.0 OSTEOARTHRITIS OF HIPS, BILATERAL: Status: ACTIVE | Noted: 2023-10-03

## 2023-10-03 PROBLEM — R29.898 WEAKNESS OF RIGHT LOWER EXTREMITY: Status: ACTIVE | Noted: 2023-10-03

## 2023-10-03 PROBLEM — E11.9 DIABETES MELLITUS (MULTI): Status: ACTIVE | Noted: 2023-10-03

## 2023-10-03 PROBLEM — Z86.005: Status: ACTIVE | Noted: 2018-02-21

## 2023-10-03 PROBLEM — C32.0 VOCAL CORD CANCER (MULTI): Status: ACTIVE | Noted: 2023-10-03

## 2023-10-03 PROBLEM — M51.36 BULGING LUMBAR DISC: Status: ACTIVE | Noted: 2023-10-03

## 2023-10-03 PROBLEM — J38.3 VOCAL FOLD LEUKOPLAKIA: Status: ACTIVE | Noted: 2018-02-21

## 2023-10-03 PROBLEM — E66.9 OBESITY, CLASS I, BMI 30-34.9: Status: ACTIVE | Noted: 2018-10-17

## 2023-10-03 PROBLEM — M06.9 RHEUMATOID ARTHRITIS (MULTI): Status: ACTIVE | Noted: 2023-10-03

## 2023-10-03 RX ORDER — HYDROCODONE BITARTRATE AND ACETAMINOPHEN 5; 325 MG/1; MG/1
1 TABLET ORAL EVERY 6 HOURS PRN
COMMUNITY
Start: 2017-03-07 | End: 2023-10-06 | Stop reason: ALTCHOICE

## 2023-10-03 RX ORDER — PYRIDOXINE HCL (VITAMIN B6) 100 MG
TABLET ORAL
COMMUNITY

## 2023-10-03 RX ORDER — LANCETS 33 GAUGE
EACH MISCELLANEOUS DAILY
COMMUNITY
End: 2024-02-16 | Stop reason: SDUPTHER

## 2023-10-03 RX ORDER — OXYCODONE AND ACETAMINOPHEN 5; 325 MG/1; MG/1
1 TABLET ORAL
COMMUNITY
Start: 2022-10-28 | End: 2023-10-06 | Stop reason: ALTCHOICE

## 2023-10-03 RX ORDER — TOPIRAMATE 100 MG/1
100 TABLET, FILM COATED ORAL 2 TIMES DAILY
COMMUNITY
End: 2023-10-06 | Stop reason: ALTCHOICE

## 2023-10-03 RX ORDER — CITALOPRAM 20 MG/1
20 TABLET, FILM COATED ORAL AS NEEDED
COMMUNITY
End: 2023-10-06 | Stop reason: ALTCHOICE

## 2023-10-03 RX ORDER — DULOXETIN HYDROCHLORIDE 30 MG/1
1 CAPSULE, DELAYED RELEASE ORAL DAILY
COMMUNITY
Start: 2022-01-03 | End: 2023-10-06 | Stop reason: ALTCHOICE

## 2023-10-03 RX ORDER — ZOLPIDEM TARTRATE 10 MG/1
10 TABLET ORAL DAILY PRN
COMMUNITY
End: 2023-10-06 | Stop reason: ALTCHOICE

## 2023-10-03 RX ORDER — PREGABALIN 25 MG/1
1 CAPSULE ORAL 2 TIMES DAILY
COMMUNITY
Start: 2022-11-09 | End: 2023-11-16 | Stop reason: WASHOUT

## 2023-10-03 RX ORDER — PREGABALIN 50 MG/1
50 CAPSULE ORAL 2 TIMES DAILY
COMMUNITY
End: 2023-10-06 | Stop reason: ALTCHOICE

## 2023-10-03 RX ORDER — METFORMIN HYDROCHLORIDE EXTENDED-RELEASE TABLETS 500 MG/1
1000 TABLET, FILM COATED, EXTENDED RELEASE ORAL
COMMUNITY
End: 2024-02-16 | Stop reason: WASHOUT

## 2023-10-03 RX ORDER — SERTRALINE HYDROCHLORIDE 50 MG/1
50 TABLET, FILM COATED ORAL DAILY
COMMUNITY
End: 2023-10-06 | Stop reason: ALTCHOICE

## 2023-10-03 RX ORDER — OMEPRAZOLE 20 MG/1
40 CAPSULE, DELAYED RELEASE ORAL
COMMUNITY
Start: 2017-03-07 | End: 2023-10-06 | Stop reason: ALTCHOICE

## 2023-10-03 RX ORDER — BLOOD SUGAR DIAGNOSTIC
STRIP MISCELLANEOUS
COMMUNITY
Start: 2020-12-04 | End: 2024-02-16 | Stop reason: SDUPTHER

## 2023-10-03 RX ORDER — NABUMETONE 750 MG/1
750 TABLET, FILM COATED ORAL
COMMUNITY
Start: 2022-03-17 | End: 2023-10-06 | Stop reason: ALTCHOICE

## 2023-10-03 RX ORDER — ATORVASTATIN CALCIUM 10 MG/1
10 TABLET, FILM COATED ORAL DAILY
COMMUNITY
End: 2023-10-06 | Stop reason: ALTCHOICE

## 2023-10-04 ENCOUNTER — TELEPHONE (OUTPATIENT)
Dept: PAIN MEDICINE | Facility: CLINIC | Age: 58
End: 2023-10-04
Payer: MEDICARE

## 2023-10-04 NOTE — TELEPHONE ENCOUNTER
Called and educated patient to hold Aspirin and NSAIDS for 24 hours before and after 10/06/23 injection.  Patient verbalized understanding and denied questions.

## 2023-10-06 ENCOUNTER — HOSPITAL ENCOUNTER (OUTPATIENT)
Dept: OPERATING ROOM | Facility: HOSPITAL | Age: 58
Discharge: HOME | End: 2023-10-06
Payer: MEDICARE

## 2023-10-06 ENCOUNTER — HOSPITAL ENCOUNTER (OUTPATIENT)
Dept: RADIOLOGY | Facility: HOSPITAL | Age: 58
Discharge: HOME | End: 2023-10-06
Payer: MEDICARE

## 2023-10-06 VITALS
HEART RATE: 65 BPM | RESPIRATION RATE: 18 BRPM | TEMPERATURE: 98.9 F | WEIGHT: 183.2 LBS | SYSTOLIC BLOOD PRESSURE: 152 MMHG | OXYGEN SATURATION: 98 % | HEIGHT: 63 IN | DIASTOLIC BLOOD PRESSURE: 84 MMHG | BODY MASS INDEX: 32.46 KG/M2

## 2023-10-06 PROCEDURE — 2550000001 HC RX 255 CONTRASTS

## 2023-10-06 PROCEDURE — 2500000005 HC RX 250 GENERAL PHARMACY W/O HCPCS

## 2023-10-06 PROCEDURE — 76000 FLUOROSCOPY <1 HR PHYS/QHP: CPT | Mod: 59

## 2023-10-06 PROCEDURE — 62323 NJX INTERLAMINAR LMBR/SAC: CPT

## 2023-10-06 PROCEDURE — 7100000009 HC PHASE TWO TIME - INITIAL BASE CHARGE: Performed by: ANESTHESIOLOGY

## 2023-10-06 PROCEDURE — A4216 STERILE WATER/SALINE, 10 ML: HCPCS

## 2023-10-06 PROCEDURE — 2500000004 HC RX 250 GENERAL PHARMACY W/ HCPCS (ALT 636 FOR OP/ED)

## 2023-10-06 PROCEDURE — 7100000010 HC PHASE TWO TIME - EACH INCREMENTAL 1 MINUTE: Performed by: ANESTHESIOLOGY

## 2023-10-06 ASSESSMENT — PAIN SCALES - GENERAL
PAINLEVEL_OUTOF10: 0 - NO PAIN
PAINLEVEL_OUTOF10: 5 - MODERATE PAIN
PAINLEVEL_OUTOF10: 5 - MODERATE PAIN

## 2023-10-06 ASSESSMENT — PAIN DESCRIPTION - DESCRIPTORS: DESCRIPTORS: BURNING;ACHING;DISCOMFORT;SORE

## 2023-10-06 ASSESSMENT — COLUMBIA-SUICIDE SEVERITY RATING SCALE - C-SSRS
1. IN THE PAST MONTH, HAVE YOU WISHED YOU WERE DEAD OR WISHED YOU COULD GO TO SLEEP AND NOT WAKE UP?: NO
2. HAVE YOU ACTUALLY HAD ANY THOUGHTS OF KILLING YOURSELF?: NO

## 2023-10-06 ASSESSMENT — PAIN - FUNCTIONAL ASSESSMENT
PAIN_FUNCTIONAL_ASSESSMENT: 0-10

## 2023-10-06 NOTE — OP NOTE
* No procedures listed * Operative Note     Date: 10/6/2023  OR Location: [place] [address] OR    Name: Hallie Demarco, : 1965, Age: 58 y.o., MRN: [id number], Sex: female    Diagnosis  Pre-op Diagnosis     * Lumbar radiculopathy [M54.16] * No Diagnosis Codes entered *     Procedures  L4-5 epidural steroid injection    Surgeons   Dr. Yosvany Fall    Resident/Fellow/Other Assistant:  None    Procedure Summary  Anesthesia: Local ASA: ASA status not filed in the log.  Anesthesia Staff: No anesthesia staff entered.  Estimated Blood Loss: 0 mL  Intra-op Medications: * Intraprocedure medication information is unavailable because the case start and end events have not been set *      Intraprocedure I/O Totals       None           Specimen: No specimens collected     Staff:   Circulator: Dimple Rebolledo RN; Hellen Weber RN; Brandon Earl RN  Scrub Person: Kael Workman         Drains and/or Catheters: * None in log *    Tourniquet Times:         Implants:     Findings: None    Indications: Hallie Demarco is an 58 y.o. female who is having surgery for * No pre-op diagnosis entered *.  Lumbar radiculopathy    The patient was seen in the preoperative area. The risks, benefits, complications, treatment options, non-operative alternatives, expected recovery and outcomes were discussed with the patient. The possibilities of reaction to medication, pulmonary aspiration, injury to surrounding structures, bleeding, recurrent infection, the need for additional procedures, failure to diagnose a condition, and creating a complication requiring transfusion or operation were discussed with the patient. The patient concurred with the proposed plan, giving informed consent.  The site of surgery was properly noted/marked if necessary per policy. The patient has been actively warmed in preoperative area. Preoperative antibiotics    Procedure Details: The patient was identified in the preoperative holding area.  Risks, benefits, and  alternatives were reviewed with the patient.  The patient wishes to proceed.  Consent was signed and sites were marked.  The patient was brought to the operating room placed in the prone position with a pillow underneath the abdomen to reduce lumbar lordosis. Time out was performed.  The patient's skin was prepped and draped in surgical sterile fashion.  Skin and subcutaneous tissues were anesthetized with total of 5 mL of 2% lidocaine through 25-gauge needle.  An 18-gauge Touhy needle was advanced under intermittent AP and contralateral oblique fluoroscopy to the L4-5 epidural space using loss of resistance technique. Confirmation of proper needle placement was performed under fluoroscopy with 2 mL of Omnipaque contrast which showed appropriate spread without vascular uptake.  After confirmation of negative aspiration, 6 mL of preservative-free normal saline, and 10 mg of preservative-free dexamethasone were injected through each needle.  The needle was removed and bandage applied.  The patient was brought to the recovery room in good condition with no apparent complications.    Complications:  None; patient tolerated the procedure well.    Disposition: PACU - hemodynamically stable.  Condition: stable         Additional Details: none      Dr. Yosvany Fall

## 2023-10-06 NOTE — H&P
"History Of Present Illness  Hallie Demarco is a 58 y.o. female presenting with low back and leg pain.  Last seen in the office on 2023.  No changes to her symptoms.  Plan at that time was to proceed with an L4/5 epidural steroid injection today.  The patient would like to proceed..     Past Medical History  Past Medical History:   Diagnosis Date    Encounter for  delivery without indication     Delivery of pregnancy by  section    Personal history of other medical treatment     History of mammogram       Surgical History  Past Surgical History:   Procedure Laterality Date    OTHER SURGICAL HISTORY  10/09/2019    Tubal ligation    OTHER SURGICAL HISTORY  2022    Bladder surgery    OTHER SURGICAL HISTORY  2022    Hysterectomy total    OTHER SURGICAL HISTORY  10/22/2019     section    OTHER SURGICAL HISTORY  2020    Colonoscopy        Social History  She reports that she quit smoking about 20 years ago. Her smoking use included cigarettes. She has never used smokeless tobacco. No history on file for alcohol use and drug use.    Family History  Family History   Problem Relation Name Age of Onset    Kidney failure Mother      Other (cardiac disorder) Sister          PAD        Allergies  Celecoxib, Doxycycline, Gabapentin, Levofloxacin, Sulfamethoxazole-trimethoprim, and Topiramate    Review of Systems     Physical Exam     Last Recorded Vitals  Pulse 63, temperature 36.4 °C (97.6 °F), temperature source Temporal, resp. rate 20, height 1.6 m (5' 2.99\"), weight 83.1 kg (183 lb 3.2 oz).    Relevant Results             Assessment/Plan   Active Problems:  There are no active Hospital Problems.      Lumbar radiculopathy secondary to disc displacement.  Plan for L4-5 epidural steroid injection today.         Yosvany Fall MD    "

## 2023-10-06 NOTE — Clinical Note
Pt arrived to OR on cart. Transferred to OR table. Safety strap applied. Pt padded with pillows in prone  position. Pt prepped with Chloraprep with a fire risk of 1. Bandaids applied to injection sites. Pt. Transferred to ACS on cart.

## 2023-11-07 ENCOUNTER — OFFICE VISIT (OUTPATIENT)
Dept: PAIN MEDICINE | Facility: CLINIC | Age: 58
End: 2023-11-07
Payer: MEDICARE

## 2023-11-07 VITALS
BODY MASS INDEX: 32.96 KG/M2 | SYSTOLIC BLOOD PRESSURE: 152 MMHG | HEART RATE: 70 BPM | RESPIRATION RATE: 16 BRPM | DIASTOLIC BLOOD PRESSURE: 83 MMHG | HEIGHT: 63 IN | WEIGHT: 186 LBS

## 2023-11-07 DIAGNOSIS — M54.16 LUMBAR NEURITIS: ICD-10-CM

## 2023-11-07 DIAGNOSIS — M51.26 LUMBAR DISC HERNIATION: Primary | ICD-10-CM

## 2023-11-07 DIAGNOSIS — M54.16 LUMBAR BACK PAIN WITH RADICULOPATHY AFFECTING RIGHT LOWER EXTREMITY: ICD-10-CM

## 2023-11-07 DIAGNOSIS — G89.4 CHRONIC PAIN DISORDER: ICD-10-CM

## 2023-11-07 DIAGNOSIS — R20.2 NUMBNESS AND TINGLING: ICD-10-CM

## 2023-11-07 DIAGNOSIS — M51.36 BULGING LUMBAR DISC: ICD-10-CM

## 2023-11-07 DIAGNOSIS — R20.0 NUMBNESS AND TINGLING: ICD-10-CM

## 2023-11-07 PROCEDURE — 99214 OFFICE O/P EST MOD 30 MIN: CPT | Performed by: PHYSICIAN ASSISTANT

## 2023-11-07 ASSESSMENT — ENCOUNTER SYMPTOMS
ARTHRALGIAS: 1
PSYCHIATRIC NEGATIVE: 1
CONSTITUTIONAL NEGATIVE: 1
EYES NEGATIVE: 1
GASTROINTESTINAL NEGATIVE: 1
RESPIRATORY NEGATIVE: 1
BACK PAIN: 1
HEMATOLOGIC/LYMPHATIC NEGATIVE: 1
NUMBNESS: 1
ALLERGIC/IMMUNOLOGIC NEGATIVE: 1
CARDIOVASCULAR NEGATIVE: 1
ENDOCRINE NEGATIVE: 1

## 2023-11-07 ASSESSMENT — PAIN - FUNCTIONAL ASSESSMENT: PAIN_FUNCTIONAL_ASSESSMENT: 0-10

## 2023-11-07 ASSESSMENT — PAIN SCALES - GENERAL
PAINLEVEL_OUTOF10: 6
PAINLEVEL: 6

## 2023-11-07 ASSESSMENT — PATIENT HEALTH QUESTIONNAIRE - PHQ9
SUM OF ALL RESPONSES TO PHQ9 QUESTIONS 1 AND 2: 0
1. LITTLE INTEREST OR PLEASURE IN DOING THINGS: NOT AT ALL
2. FEELING DOWN, DEPRESSED OR HOPELESS: NOT AT ALL

## 2023-11-07 ASSESSMENT — COLUMBIA-SUICIDE SEVERITY RATING SCALE - C-SSRS
6. HAVE YOU EVER DONE ANYTHING, STARTED TO DO ANYTHING, OR PREPARED TO DO ANYTHING TO END YOUR LIFE?: NO
1. IN THE PAST MONTH, HAVE YOU WISHED YOU WERE DEAD OR WISHED YOU COULD GO TO SLEEP AND NOT WAKE UP?: NO
2. HAVE YOU ACTUALLY HAD ANY THOUGHTS OF KILLING YOURSELF?: NO

## 2023-11-07 NOTE — PROGRESS NOTES
"Subjective   Patient ID: Hallie Demarco is a 58 y.o. female who presents for Back Pain (Patient is following up today from a L4-5 KARSTEN that she had on 10/06/23.  Patient reports that \"this injection was completely different\".  Patient denied getting any relief from this injection.  Patient complains of pain in her lower back radiating into her bilateral hips and down her legs to her ankles.  She reports numbness and tingling in her legs.  She rates her pain a 6/10 today.  She states it does sometimes get worse than that.).    ZULY score 60.  Patient able to sit 0.5 hours, stand 30 minutes, and walk 100 yards.  MMA updated today.  ORT score 1.  Patient is a 58-year-old female.  She presents today for follow-up after undergoing a repeat L4-5 interlaminar epidural steroid injection.  This was done on 10/6/2022 and unfortunate, patient states that she did not get the relief she got from her previous epidural.  She continues to have lower back pain with bilateral radiating leg pain down to her ankles.  This affects her ambulatory status.  This affects her ability to do the things she wants to do.  It affects her quality of life.  It affects her ability to ambulate.  She rates the discomfort a 6/10.  She wonders if there is something else that she can try to do to get some relief.  She continues to use Lyrica with some improvement not quite enough.  Historically, conservative treatments have not helped her.  She is discouraged that this injection did not work as the other did.        Review of Systems   Constitutional: Negative.    HENT: Negative.     Eyes: Negative.    Respiratory: Negative.     Cardiovascular: Negative.    Gastrointestinal: Negative.    Endocrine: Negative.    Genitourinary: Negative.    Musculoskeletal:  Positive for arthralgias, back pain and gait problem.   Skin: Negative.    Allergic/Immunologic: Negative.    Neurological:  Positive for numbness.   Hematological: Negative.    Psychiatric/Behavioral: " Negative.         Objective   Physical Exam  Vitals and nursing note reviewed.   Constitutional:       Appearance: Normal appearance. She is normal weight.   HENT:      Head: Normocephalic and atraumatic.      Right Ear: External ear normal.      Left Ear: External ear normal.      Mouth/Throat:      Pharynx: Oropharynx is clear.   Eyes:      Conjunctiva/sclera: Conjunctivae normal.   Cardiovascular:      Rate and Rhythm: Normal rate.      Pulses: Normal pulses.   Pulmonary:      Effort: Pulmonary effort is normal.   Musculoskeletal:         General: Normal range of motion.      Cervical back: Normal range of motion.      Comments: 5/5 lower extremity strength other than bilateral hip flexion, ADF and EHL 5 -/5   Neurological:      General: No focal deficit present.      Mental Status: She is alert and oriented to person, place, and time. Mental status is at baseline.   Psychiatric:         Mood and Affect: Mood normal.         Behavior: Behavior normal.         Thought Content: Thought content normal.         Judgment: Judgment normal.         MR LUMBAR SPINE WO IV CONTRAST  Status: Final result     PACS Images     Show images for MR LUMBAR SPINE WO IV CONTRAST  Signed by    Signed Time Phone Pager   Shay Meyer 1/06/2023 16:04 [phone]      Exam Information    Status Exam Begun Exam Ended   Final 1/06/2023 13:18 1/06/2023 14:12     Study Result    Narrative & Impression   MRN: [id number]  Patient Name: JOSIE ORTEGA     STUDY:  MRI L-SPINE WO;  1/6/2023 2:12 pm     INDICATION:  Chronic lower back pain with bilateral leg tingling and right leg  weakness. Pain radiates into bilateral gluteal, left groin, and  bilateral anterior/posterior legs into the top of her foot.     COMPARISON:  10/25/2019     ACCESSION NUMBER(S):  [id number]     ORDERING CLINICIAN:  MAL CORLEY     TECHNIQUE:  Sagittal T1, T2, STIR, axial T1 and T2 weighted images of the lumbar  spine were acquired.     FINDINGS:  Transitional anatomy  characterized by 6 lumbar-type vertebrae with  partial lumbarization of S1. The last well-formed disc space will be  considered S1-2 corresponding to axial series 11, image 18.     Alignment: The alignment is unremarkable.     Vertebral bodies demonstrate expected height without abnormal marrow  signal. Mild multilevel degenerative changes worst at the L5 superior  endplate. Normal disc height and T2 signal of the intervertebral  discs.     The conus terminates at L1 and appears unremarkable in appearance.     T12-L1: Only imaged in the sagittal plane. There is no posterior disc  contour abnormality. There is no spinal canal stenosis or neural  foramina narrowing.     L1-L2: There is no posterior disc contour abnormality. There is no  spinal canal stenosis or neural foraminal narrowing. There is mild  bilateral facet osteoarthropathy, worse on the right.     L2-L3: There is no posterior disc contour abnormality. There is no  spinal canal stenosis or neural foraminal narrowing. There is fluid  within the bilateral facet joints.     L3-L4: There is no posterior disc contour abnormality. There is no  spinal canal stenosis or neural foraminal narrowing. There is fluid  within the left facet joint with worse osteoarthropathy in the right  facet joint.     L4-L5: There is mild posterior disc bulge asymmetric to the right  with the extraforaminal segment contacting the right L4 nerve root.  There is no spinal canal stenosis or left neural foraminal narrowing.  There is mild bilateral facet osteoarthropathy, worse on the right.  There is fluid within the bilateral facet joint, worse on the left.     L5-S1: There is no posterior disc contour abnormality. There is no  spinal canal stenosis or neural foraminal narrowing. There is mild  bilateral facet osteoarthropathy.     The prevertebral and posterior paraspinous soft tissues are  unremarkable.     IMPRESSION:  Transitional anatomy characterized by 6 lumbar-type vertebrae  with  partial lumbarization of S1. The last well-formed disc space will be  considered S1-2 corresponding to axial series 11, image 18.     Disc bulge asymmetric to the right at L4-L5 with the extraforaminal  component contacting the exiting right L4 nerve root. Additional mild  multilevel degenerative changes as described. Findings are stable in  appearance compared to prior MRI 10/25/2019.     I personally reviewed the images/study and I agree with the findings  as stated. This study was interpreted at [facility], [place], [place].     Assessment/Plan   Diagnoses and all orders for this visit:  Lumbar disc herniation  Lumbar neuritis  Numbness and tingling  Lumbar back pain with radiculopathy affecting right lower extremity  Bulging lumbar disc  Chronic pain disorder    Patient is a 58-year-old female with a past medical history significant for lumbar degenerative disease, lumbar disc bulge, lumbar neuritis and chronic pain.  Unfortunate, recent repeat L4-5 interlaminar epidural steroid injection did not give her relief as the previous one did.  She is still having lower back pain with bilateral radiating leg pain that is affecting her ambulatory status.  Is affecting her quality of life.  It is affecting her ability to do the things she wants to do.  She continues on Lyrica.  OARRS reviewed.  She does not require refill.  She will call when she does.  Based on her MRI findings, her pain pattern, and her failure to historically improve with previous conservative treatments I recommended to patient a bilateral L4-5 transforaminal epidural steroid injection under fluoroscopy for both diagnostic and therapeutic purposes.  Procedure was discussed.  Risk and benefits were discussed.  Patient is agreeable.  She will follow-up 2 weeks after the injection for reevaluation.  Call clinic sooner if necessary.

## 2023-11-13 ENCOUNTER — APPOINTMENT (OUTPATIENT)
Dept: PRIMARY CARE | Facility: CLINIC | Age: 58
End: 2023-11-13
Payer: MEDICARE

## 2023-11-14 ENCOUNTER — APPOINTMENT (OUTPATIENT)
Dept: PRIMARY CARE | Facility: CLINIC | Age: 58
End: 2023-11-14
Payer: MEDICARE

## 2023-11-14 ENCOUNTER — DOCUMENTATION (OUTPATIENT)
Dept: PAIN MEDICINE | Facility: CLINIC | Age: 58
End: 2023-11-14

## 2023-11-14 NOTE — LETTER
Hallie Demarco  1965 11/14/2023    To Whom This May Concern:    My patient, Hallie Demarco, is unable to perform Jury Duty due to a mental or physical condition that renders the prospective juror unfit for jury service for the remainder of the jury year.    Should you have any questions please do not hesitate to call.    Thank you for your cooperation.    Sincerely,    Dulce Pradhan PA-C

## 2023-11-16 ENCOUNTER — OFFICE VISIT (OUTPATIENT)
Dept: PRIMARY CARE | Facility: CLINIC | Age: 58
End: 2023-11-16
Payer: MEDICARE

## 2023-11-16 VITALS
WEIGHT: 185 LBS | HEIGHT: 63 IN | DIASTOLIC BLOOD PRESSURE: 70 MMHG | SYSTOLIC BLOOD PRESSURE: 138 MMHG | BODY MASS INDEX: 32.78 KG/M2 | HEART RATE: 63 BPM

## 2023-11-16 DIAGNOSIS — Z86.005 HISTORY OF CARCINOMA IN SITU OF LARYNX: ICD-10-CM

## 2023-11-16 DIAGNOSIS — M54.16 LUMBAR NEURITIS: ICD-10-CM

## 2023-11-16 DIAGNOSIS — F41.9 ANXIETY: ICD-10-CM

## 2023-11-16 DIAGNOSIS — G25.81 RESTLESS LEG SYNDROME: ICD-10-CM

## 2023-11-16 DIAGNOSIS — K21.9 CHRONIC GERD: ICD-10-CM

## 2023-11-16 DIAGNOSIS — E11.9 TYPE 2 DIABETES MELLITUS WITHOUT COMPLICATION, UNSPECIFIED WHETHER LONG TERM INSULIN USE (MULTI): ICD-10-CM

## 2023-11-16 DIAGNOSIS — Z13.6 SCREENING FOR CARDIOVASCULAR CONDITION: ICD-10-CM

## 2023-11-16 DIAGNOSIS — Z00.00 ROUTINE GENERAL MEDICAL EXAMINATION AT HEALTH CARE FACILITY: Primary | ICD-10-CM

## 2023-11-16 PROBLEM — C32.0 VOCAL CORD CANCER (MULTI): Status: RESOLVED | Noted: 2023-10-03 | Resolved: 2023-11-16

## 2023-11-16 PROBLEM — M06.9 RHEUMATOID ARTHRITIS (MULTI): Status: RESOLVED | Noted: 2023-10-03 | Resolved: 2023-11-16

## 2023-11-16 PROCEDURE — 3075F SYST BP GE 130 - 139MM HG: CPT

## 2023-11-16 PROCEDURE — G0439 PPPS, SUBSEQ VISIT: HCPCS

## 2023-11-16 PROCEDURE — 1036F TOBACCO NON-USER: CPT

## 2023-11-16 PROCEDURE — 3078F DIAST BP <80 MM HG: CPT

## 2023-11-16 PROCEDURE — 99214 OFFICE O/P EST MOD 30 MIN: CPT

## 2023-11-16 PROCEDURE — 3051F HG A1C>EQUAL 7.0%<8.0%: CPT

## 2023-11-16 RX ORDER — DULAGLUTIDE 0.75 MG/.5ML
0.75 INJECTION, SOLUTION SUBCUTANEOUS
Qty: 6 ML | Refills: 3 | Status: SHIPPED | OUTPATIENT
Start: 2023-11-16 | End: 2024-02-16 | Stop reason: SINTOL

## 2023-11-16 RX ORDER — ROPINIROLE 2 MG/1
2 TABLET, FILM COATED ORAL NIGHTLY
Qty: 90 TABLET | Refills: 3 | Status: SHIPPED | OUTPATIENT
Start: 2023-11-16 | End: 2024-11-15

## 2023-11-16 ASSESSMENT — ACTIVITIES OF DAILY LIVING (ADL)
DRESSING: INDEPENDENT
GROCERY_SHOPPING: INDEPENDENT
MANAGING_FINANCES: INDEPENDENT
TAKING_MEDICATION: INDEPENDENT
BATHING: INDEPENDENT
DOING_HOUSEWORK: NEEDS ASSISTANCE

## 2023-11-16 ASSESSMENT — PATIENT HEALTH QUESTIONNAIRE - PHQ9
1. LITTLE INTEREST OR PLEASURE IN DOING THINGS: NOT AT ALL
2. FEELING DOWN, DEPRESSED OR HOPELESS: NOT AT ALL
SUM OF ALL RESPONSES TO PHQ9 QUESTIONS 1 AND 2: 0

## 2023-11-16 ASSESSMENT — ENCOUNTER SYMPTOMS
GASTROINTESTINAL NEGATIVE: 1
CONSTITUTIONAL NEGATIVE: 1
CARDIOVASCULAR NEGATIVE: 1
RESPIRATORY NEGATIVE: 1

## 2023-11-16 NOTE — PROGRESS NOTES
Subjective   Patient ID: Hallie Demarco is a 58 y.o. female who presents for 2 month med check  (Pt would like to discuss starting Trulicity , BG are elevated and having excessive hunger , Bg 150-179 AM ).    HPI   DM2: A1C 7% 7/5/23. Compliant with metforming, no SE. Blood sugar at home has been averaging 150-170s.  GERD: PPI effective, sometimes breakthrough heartburn. She avoids trigger foods/drinks, HOB is raised. B12 level good 9/9/22.   HYPERLIPIDEMIA: Lipids WNL 7/5/23. Compliant with statin, no SE.  BL HIP/BACK PAIN/BL LE NEUROPATHY: Fall on ice 2018, still w/residual pain/symptoms. Following with pain mgmt, receives injections. Has seen neurosurgeon Dr. Wright and does not think that neuropathy symptoms are from spine, EMG unremarkable June 2023. Dr. Wright thinks possible restless leg. Endorses nightly leg spasms and tingling with some mild burning BL LE, getting into hot bath helps, Requip 1mg has been helpful, does not completely resolve symptoms. Lyrica only somewhat effective. Following with pain mgmt, received injection recently which was not effective. Ropinirole 2mg has been helpful.  ANXIETY/DEPRESSION: Compliant w/sertraline, no SE, stable.  EDEMA: BL LE edema, trial of compression stockings somewhat helpful, but will redistribute fluid above top of stockings right below knees.    Hx of tx by Dr. Faria in [place] ([facility]) for non-RA factor positive RA. Then was told by another rheumatologist in [place] which said she did not have RA.      BP a little elevated today, brought in log and averaging <130/80 at home, denies CP/SOB/dizziness.     Dermatology, sees Dejan at [facility].  Hx of cystocele, s/p hysterectomy. Women's health with Dr. Hope.  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 No further follow up.  Mammogram showed asymmetry 7/2023, US showed prob benign, will have more imaging this coming January.   Colonoscopy good 2015, due again 2025.    Review of Systems  "  Constitutional: Negative.    Respiratory: Negative.     Cardiovascular: Negative.    Gastrointestinal: Negative.        Objective   /70   Pulse 63   Ht 1.6 m (5' 3\")   Wt 83.9 kg (185 lb)   BMI 32.77 kg/m²     Physical Exam  Constitutional:       General: She is not in acute distress.     Appearance: Normal appearance. She is not ill-appearing.   HENT:      Head: Normocephalic and atraumatic.   Eyes:      Extraocular Movements: Extraocular movements intact.      Conjunctiva/sclera: Conjunctivae normal.   Cardiovascular:      Rate and Rhythm: Normal rate.   Pulmonary:      Effort: Pulmonary effort is normal.   Abdominal:      General: There is no distension.   Musculoskeletal:      Cervical back: Normal range of motion.   Skin:     General: Skin is warm and dry.   Neurological:      General: No focal deficit present.      Mental Status: She is alert and oriented to person, place, and time.   Psychiatric:         Mood and Affect: Mood normal.         Behavior: Behavior normal.         Thought Content: Thought content normal.         Judgment: Judgment normal.         Assessment/Plan        Start Trulicity 0.75mg weekly, she will call in 1 month with blood sugar reading and we may lower metformin dose.  Continue Ropinirole.  Advised pull compression stockings above knees, may consider Lasix at follow up if still with edema.  CT cardiac calcium score ordered.  Follow up 3 months with fasting labs prior.  "

## 2023-11-16 NOTE — PROGRESS NOTES
Subjective   Reason for Visit: Hallie Demarco is an 58 y.o. female here for a Medicare Wellness visit.     Past Medical, Surgical, and Family History reviewed and updated in chart.    Reviewed all medications by prescribing practitioner or clinical pharmacist (such as prescriptions, OTCs, herbal therapies and supplements) and documented in the medical record.    HPI  DM2: A1C 7% 7/5/23. Compliant with metforming, no SE. Blood sugar at home has been averaging 150-170s.  GERD: PPI effective, sometimes breakthrough heartburn. She avoids trigger foods/drinks, HOB is raised. B12 level good 9/9/22.   HYPERLIPIDEMIA: Lipids WNL 7/5/23. Compliant with statin, no SE.  BL HIP/BACK PAIN/BL LE NEUROPATHY: Fall on ice 2018, still w/residual pain/symptoms. Following with pain mgmt, receives injections. Has seen neurosurgeon Dr. Wright and does not think that neuropathy symptoms are from spine, EMG unremarkable June 2023. Dr. Wright thinks possible restless leg. Endorses nightly leg spasms and tingling with some mild burning BL LE, getting into hot bath helps, Requip 1mg has been helpful, does not completely resolve symptoms. Lyrica only somewhat effective. Following with pain mgmt, received injection recently which was not effective. Ropinirole 2mg has been helpful.  ANXIETY/DEPRESSION: Compliant w/sertraline, no SE, stable.  EDEMA: BL LE edema, trial of compression stockings somewhat helpful, but will redistribute fluid above top of stockings right below knees.    Hx of tx by Dr. Faria in [place] ([facility]) for non-RA factor positive RA. Then was told by another rheumatologist in [place] which said she did not have RA.      BP a little elevated today, brought in log and averaging <130/80 at home, denies CP/SOB/dizziness.     Dermatology, sees Dejan at [facility].  Hx of cystocele, s/p hysterectomy. Women's health with Dr. Hope.  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 No further follow up.  Mammogram showed  "asymmetry 7/2023, US showed prob benign, will have more imaging this coming January.   Colonoscopy good 2015, due again 2025.    Patient Care Team:  Rosales Zamarripa PA-C as PCP - General  Valentino Carter MD as PCP - [employer] PCP     Review of Systems   Constitutional: Negative.    Respiratory: Negative.     Cardiovascular: Negative.    Gastrointestinal: Negative.        Objective   Vitals:  /70   Pulse 63   Ht 1.6 m (5' 3\")   Wt 83.9 kg (185 lb)   BMI 32.77 kg/m²       Physical Exam  Constitutional:       General: She is not in acute distress.     Appearance: Normal appearance. She is not ill-appearing.   HENT:      Head: Normocephalic and atraumatic.   Eyes:      Extraocular Movements: Extraocular movements intact.      Conjunctiva/sclera: Conjunctivae normal.   Cardiovascular:      Rate and Rhythm: Normal rate.   Pulmonary:      Effort: Pulmonary effort is normal.   Abdominal:      General: There is no distension.   Musculoskeletal:      Cervical back: Normal range of motion.   Skin:     General: Skin is warm and dry.   Neurological:      General: No focal deficit present.      Mental Status: She is alert and oriented to person, place, and time.   Psychiatric:         Mood and Affect: Mood normal.         Behavior: Behavior normal.         Thought Content: Thought content normal.         Judgment: Judgment normal.         Assessment/Plan   Problem List Items Addressed This Visit       Restless leg syndrome    Relevant Medications    rOPINIRole (Requip) 2 mg tablet    Type 2 diabetes mellitus without complication (CMS/HCC) - Primary    Relevant Medications    dulaglutide (Trulicity) 0.75 mg/0.5 mL pen injector    Other Relevant Orders    CBC    Lipid Panel    Comprehensive Metabolic Panel    Hemoglobin A1C    Anxiety    Lumbar neuritis    Chronic GERD    History of carcinoma in situ of larynx     Other Visit Diagnoses       Screening for cardiovascular condition        Relevant Orders    " CT cardiac scoring wo IV contrast              Start Trulicity 0.75mg weekly, she will call in 1 month with blood sugar reading and we may lower metformin dose.  Continue Ropinirole.  Advised pull compression stockings above knees, may consider Lasix at follow up if still with edema.  CT cardiac calcium score ordered.  Follow up 3 months with fasting labs prior.

## 2023-11-20 ENCOUNTER — HOSPITAL ENCOUNTER (OUTPATIENT)
Dept: RADIOLOGY | Facility: HOSPITAL | Age: 58
Discharge: HOME | End: 2023-11-20
Payer: MEDICARE

## 2023-11-20 ENCOUNTER — TELEPHONE (OUTPATIENT)
Dept: PRIMARY CARE | Facility: CLINIC | Age: 58
End: 2023-11-20
Payer: MEDICARE

## 2023-11-20 DIAGNOSIS — Z13.6 SCREENING FOR CARDIOVASCULAR CONDITION: ICD-10-CM

## 2023-11-20 PROCEDURE — 75571 CT HRT W/O DYE W/CA TEST: CPT

## 2023-11-20 NOTE — TELEPHONE ENCOUNTER
Wasn't able to get the Trulicity because of the cost. Asking how to get a coupon. Asking for a call back.

## 2023-11-20 NOTE — TELEPHONE ENCOUNTER
Emergency Department Provider Note                   PCP:                None Provider               Age: 28 y.o. Sex: female     No diagnosis found. DISPOSITION          Medical Decision Making  Her pain and symptoms I will treat with some IV morphine, IV fluids, and IV Zofran. I discussed with her that pending the results of her blood work we may need to do ultrasound or CT scan imaging. Due to her external medical records including multiple previous ER visits. I will plan to refer her to a urologist since she appears to have frequent UTI. I do not think she has anything urgent or emergent. I will discharge her home with Cipro and Voltaren. She does says she is allergic to Toradol but it appears as if she has tolerated Voltaren in the past.    Amount and/or Complexity of Data Reviewed  External Data Reviewed: notes. Labs: ordered. Risk  Prescription drug management. ED Course as of 01/29/23 1411   Sun Jan 29, 2023   1405 Her blood work is unremarkable. Her urine shows evidence for a probable infection. I will plan to discharge her home with a prescription for some antibiotics and pain medicine. [AC]      ED Course User Index  [AC] Kim Leach MD        Orders Placed This Encounter   Procedures    CBC with Auto Differential    Comprehensive Metabolic Panel    Lipase    POC PREGNANCY UR-QUAL    POCT Urine Dipstick    Saline lock IV        Medications   morphine injection 4 mg (has no administration in time range)   ondansetron (ZOFRAN) injection 4 mg (has no administration in time range)   lactated ringers bolus (has no administration in time range)       New Prescriptions    No medications on file        Senia Rain is a 28 y.o. female who presents to the Emergency Department with chief complaint of  No chief complaint on file. 70-year-old lady presents with concerns about pain in her epigastric region.   She says with this pain she has had significant I printed out a rx card for $25 for pt to take to pharmacy to see if this can be used to pay for her Trulicity- placed up front for her to     nausea and vomiting. She notes that every time she tries to eat or drink she feels like everything comes back up. She has no prior history of gallbladder or pancreas issues. She says she does not smoke, drink, or do drugs. She has previously had her tubes tied but no other abdominal surgery. Elements of this note were created using speech recognition software. As such, errors of speech recognition may be present. Review of Systems   Constitutional:  Negative for activity change, chills and fever. HENT:  Negative for congestion and sore throat. Eyes:  Negative for redness and visual disturbance. Respiratory:  Negative for cough, shortness of breath and wheezing. Cardiovascular:  Negative for chest pain and palpitations. Gastrointestinal:  Positive for abdominal pain and nausea. Negative for diarrhea and vomiting. Endocrine: Negative for polydipsia and polyuria. Genitourinary:  Negative for flank pain and hematuria. Musculoskeletal:  Negative for joint swelling and myalgias. Skin:  Negative for color change and rash. Allergic/Immunologic: Negative for immunocompromised state. Neurological:  Negative for dizziness, light-headedness and headaches. Hematological:  Negative for adenopathy. Psychiatric/Behavioral:  Negative for confusion.       Past Medical History:   Diagnosis Date    Calculus of kidney     Headache     Ill-defined condition     kidney stones    Nausea with vomiting 3/12/2017    Recurrent UTI 3/12/2017    Tubal ligation status         Past Surgical History:   Procedure Laterality Date    GYN      tubal ligation        Family History   Problem Relation Age of Onset    No Known Problems Mother         Social History     Socioeconomic History    Marital status:    Tobacco Use    Smoking status: Never    Smokeless tobacco: Never   Substance and Sexual Activity    Alcohol use: No    Drug use: No         Compazine [prochlorperazine], Ketorolac, Nitrofurantoin, and Sulfa antibiotics     Previous Medications    DIFLUNISAL (DOLOBID) 500 MG TABS    Take 500 mg by mouth 2 times daily    HYOSCYAMINE SULFATE SL (LEVSIN/SL) 0.125 MG SUBL    Place 0.125-0.25 mg under the tongue every 6 hours as needed (Pain)    ONDANSETRON (ZOFRAN) 4 MG TABLET    Take 1 tablet by mouth 3 times daily as needed for Nausea or Vomiting    ONDANSETRON (ZOFRAN) 4 MG TABLET    Take 1 tablet by mouth every 8 hours as needed for Nausea or Vomiting    ONDANSETRON (ZOFRAN-ODT) 4 MG DISINTEGRATING TABLET    Take 1 tablet by mouth 3 times daily as needed for Nausea or Vomiting        Vitals signs and nursing note reviewed. Patient Vitals for the past 4 hrs:   Temp Pulse Resp BP SpO2   01/29/23 1221 97.9 °F (36.6 °C) 81 15 (!) 132/92 97 %          Physical Exam  Vitals and nursing note reviewed. Constitutional:       Appearance: Normal appearance. HENT:      Head: Normocephalic and atraumatic. Mouth/Throat:      Mouth: Mucous membranes are moist.   Eyes:      General:         Right eye: No discharge. Left eye: No discharge. Cardiovascular:      Rate and Rhythm: Normal rate and regular rhythm. Pulses: Normal pulses. Pulmonary:      Effort: Pulmonary effort is normal.      Breath sounds: Normal breath sounds. No wheezing. Abdominal:      General: Bowel sounds are normal.      Tenderness: There is abdominal tenderness. There is no guarding or rebound. Comments: Mild diffuse tenderness   Musculoskeletal:      Right lower leg: No edema. Left lower leg: No edema. Lymphadenopathy:      Cervical: No cervical adenopathy. Skin:     Coloration: Skin is not jaundiced. Neurological:      General: No focal deficit present. Mental Status: She is alert and oriented to person, place, and time. Mental status is at baseline. Procedures    No results found for any visits on 01/29/23.      No orders to display                       Voice dictation software was used during the making of this note. This software is not perfect and grammatical and other typographical errors may be present. This note has not been completely proofread for errors.         Naga Garcia MD  01/29/23 0168

## 2023-12-07 ENCOUNTER — TELEPHONE (OUTPATIENT)
Dept: PAIN MEDICINE | Facility: CLINIC | Age: 58
End: 2023-12-07
Payer: COMMERCIAL

## 2023-12-07 NOTE — TELEPHONE ENCOUNTER
HERO Alexander RN  Patient will call back when she is ready to schedule.  Her blood sugar has been running high and she would like to get this under control first.            Previous Messages       ----- Message -----  From: Sondra Pagan RN  Sent: 12/7/2023   8:58 AM EST  To: Sondra Pagan RN  Subject: FW: PROCEDURE                                    Called patient and left a message.  Going to ask patient is she wants 12/15/23 injection schedule.  No answer.  Left a message for patient to return call to office.    ----- Message -----  From: Lucia Warner CMA  Sent: 11/28/2023   1:42 PM EST  To: Sondra Pagan RN  Subject: PROCEDURE                                        BILATERAL L 4-5 TFESI CPT 37222 DX M54.16 APPROVED AUTH#[id number] VALID 12/15/2023-2/16/2024

## 2023-12-14 ENCOUNTER — TELEPHONE (OUTPATIENT)
Dept: PAIN MEDICINE | Facility: CLINIC | Age: 58
End: 2023-12-14
Payer: COMMERCIAL

## 2023-12-14 DIAGNOSIS — M54.16 LUMBAR NEURITIS: Primary | ICD-10-CM

## 2023-12-14 RX ORDER — PREGABALIN 100 MG/1
100 CAPSULE ORAL 2 TIMES DAILY
Qty: 60 CAPSULE | Refills: 2 | Status: SHIPPED | OUTPATIENT
Start: 2023-12-14 | End: 2024-03-13 | Stop reason: SDUPTHER

## 2023-12-14 NOTE — TELEPHONE ENCOUNTER
Patient called requesting a refill of lyrica.  Her last appointment was 11/07/23.  The last prescription written says 100 mg PO, take 1 capsule twice daily.  Thanks!

## 2023-12-18 ENCOUNTER — TELEPHONE (OUTPATIENT)
Dept: PRIMARY CARE | Facility: CLINIC | Age: 58
End: 2023-12-18
Payer: COMMERCIAL

## 2023-12-18 NOTE — TELEPHONE ENCOUNTER
I spoke to pt let her know she should call [facility] and see if they can refill the Trulicity- she has refills available- she will call us if anything further is needed

## 2023-12-18 NOTE — TELEPHONE ENCOUNTER
Patient called in and would like to try and get a refill on her trulicity as she has new insurance now and it may be covered now.    Thank you,   no

## 2024-01-08 ENCOUNTER — HOSPITAL ENCOUNTER (OUTPATIENT)
Dept: RADIOLOGY | Facility: HOSPITAL | Age: 59
Discharge: HOME | End: 2024-01-08
Payer: COMMERCIAL

## 2024-01-08 DIAGNOSIS — R92.8 ABNORMAL MAMMOGRAM: Primary | ICD-10-CM

## 2024-01-08 DIAGNOSIS — R92.8 OTHER ABNORMAL AND INCONCLUSIVE FINDINGS ON DIAGNOSTIC IMAGING OF BREAST: ICD-10-CM

## 2024-01-08 PROCEDURE — 77061 BREAST TOMOSYNTHESIS UNI: CPT | Mod: LT

## 2024-01-08 PROCEDURE — 77065 DX MAMMO INCL CAD UNI: CPT | Mod: LEFT SIDE | Performed by: RADIOLOGY

## 2024-01-08 PROCEDURE — G0279 TOMOSYNTHESIS, MAMMO: HCPCS | Mod: LEFT SIDE | Performed by: RADIOLOGY

## 2024-02-12 ENCOUNTER — LAB (OUTPATIENT)
Dept: LAB | Facility: LAB | Age: 59
End: 2024-02-12
Payer: COMMERCIAL

## 2024-02-12 DIAGNOSIS — E11.9 TYPE 2 DIABETES MELLITUS WITHOUT COMPLICATION, UNSPECIFIED WHETHER LONG TERM INSULIN USE (MULTI): ICD-10-CM

## 2024-02-12 LAB
ALBUMIN SERPL BCP-MCNC: 4.1 G/DL (ref 3.4–5)
ALP SERPL-CCNC: 83 U/L (ref 33–110)
ALT SERPL W P-5'-P-CCNC: 44 U/L (ref 7–45)
ANION GAP SERPL CALC-SCNC: 11 MMOL/L (ref 10–20)
AST SERPL W P-5'-P-CCNC: 24 U/L (ref 9–39)
BILIRUB SERPL-MCNC: 0.6 MG/DL (ref 0–1.2)
BUN SERPL-MCNC: 15 MG/DL (ref 6–23)
CALCIUM SERPL-MCNC: 9 MG/DL (ref 8.6–10.3)
CHLORIDE SERPL-SCNC: 103 MMOL/L (ref 98–107)
CHOLEST SERPL-MCNC: 131 MG/DL (ref 0–199)
CHOLESTEROL/HDL RATIO: 3
CO2 SERPL-SCNC: 29 MMOL/L (ref 21–32)
CREAT SERPL-MCNC: 0.69 MG/DL (ref 0.5–1.05)
EGFRCR SERPLBLD CKD-EPI 2021: >90 ML/MIN/1.73M*2
ERYTHROCYTE [DISTWIDTH] IN BLOOD BY AUTOMATED COUNT: 12.4 % (ref 11.5–14.5)
EST. AVERAGE GLUCOSE BLD GHB EST-MCNC: 171 MG/DL
GLUCOSE SERPL-MCNC: 184 MG/DL (ref 74–99)
HBA1C MFR BLD: 7.6 %
HCT VFR BLD AUTO: 43.1 % (ref 36–46)
HDLC SERPL-MCNC: 43 MG/DL
HGB BLD-MCNC: 13.9 G/DL (ref 12–16)
LDLC SERPL CALC-MCNC: 59 MG/DL
MCH RBC QN AUTO: 29.2 PG (ref 26–34)
MCHC RBC AUTO-ENTMCNC: 32.3 G/DL (ref 32–36)
MCV RBC AUTO: 91 FL (ref 80–100)
NON HDL CHOLESTEROL: 88 MG/DL (ref 0–149)
NRBC BLD-RTO: 0 /100 WBCS (ref 0–0)
PLATELET # BLD AUTO: 265 X10*3/UL (ref 150–450)
POTASSIUM SERPL-SCNC: 4.3 MMOL/L (ref 3.5–5.3)
PROT SERPL-MCNC: 5.9 G/DL (ref 6.4–8.2)
RBC # BLD AUTO: 4.76 X10*6/UL (ref 4–5.2)
SODIUM SERPL-SCNC: 139 MMOL/L (ref 136–145)
TRIGL SERPL-MCNC: 144 MG/DL (ref 0–149)
VLDL: 29 MG/DL (ref 0–40)
WBC # BLD AUTO: 7.8 X10*3/UL (ref 4.4–11.3)

## 2024-02-12 PROCEDURE — 80061 LIPID PANEL: CPT

## 2024-02-12 PROCEDURE — 80053 COMPREHEN METABOLIC PANEL: CPT

## 2024-02-12 PROCEDURE — 36415 COLL VENOUS BLD VENIPUNCTURE: CPT

## 2024-02-12 PROCEDURE — 85027 COMPLETE CBC AUTOMATED: CPT

## 2024-02-12 PROCEDURE — 83036 HEMOGLOBIN GLYCOSYLATED A1C: CPT

## 2024-02-16 ENCOUNTER — OFFICE VISIT (OUTPATIENT)
Dept: PRIMARY CARE | Facility: CLINIC | Age: 59
End: 2024-02-16
Payer: COMMERCIAL

## 2024-02-16 VITALS
SYSTOLIC BLOOD PRESSURE: 130 MMHG | WEIGHT: 183 LBS | BODY MASS INDEX: 32.43 KG/M2 | OXYGEN SATURATION: 97 % | DIASTOLIC BLOOD PRESSURE: 78 MMHG | HEART RATE: 67 BPM | HEIGHT: 63 IN

## 2024-02-16 DIAGNOSIS — G25.81 RESTLESS LEG SYNDROME: ICD-10-CM

## 2024-02-16 DIAGNOSIS — E11.9 TYPE 2 DIABETES MELLITUS WITHOUT COMPLICATION, UNSPECIFIED WHETHER LONG TERM INSULIN USE (MULTI): ICD-10-CM

## 2024-02-16 DIAGNOSIS — G89.29 CHRONIC BILATERAL LOW BACK PAIN WITH BILATERAL SCIATICA: ICD-10-CM

## 2024-02-16 DIAGNOSIS — I73.9 INTERMITTENT CLAUDICATION (CMS-HCC): Primary | ICD-10-CM

## 2024-02-16 DIAGNOSIS — K21.9 GASTROESOPHAGEAL REFLUX DISEASE WITHOUT ESOPHAGITIS: ICD-10-CM

## 2024-02-16 DIAGNOSIS — F41.9 ANXIETY: ICD-10-CM

## 2024-02-16 DIAGNOSIS — E78.5 HYPERLIPIDEMIA, UNSPECIFIED HYPERLIPIDEMIA TYPE: ICD-10-CM

## 2024-02-16 DIAGNOSIS — M54.41 CHRONIC BILATERAL LOW BACK PAIN WITH BILATERAL SCIATICA: ICD-10-CM

## 2024-02-16 DIAGNOSIS — M54.42 CHRONIC BILATERAL LOW BACK PAIN WITH BILATERAL SCIATICA: ICD-10-CM

## 2024-02-16 PROCEDURE — 3075F SYST BP GE 130 - 139MM HG: CPT

## 2024-02-16 PROCEDURE — 99214 OFFICE O/P EST MOD 30 MIN: CPT

## 2024-02-16 PROCEDURE — 3051F HG A1C>EQUAL 7.0%<8.0%: CPT

## 2024-02-16 PROCEDURE — 3078F DIAST BP <80 MM HG: CPT

## 2024-02-16 PROCEDURE — 3048F LDL-C <100 MG/DL: CPT

## 2024-02-16 PROCEDURE — 1036F TOBACCO NON-USER: CPT

## 2024-02-16 RX ORDER — SERTRALINE HYDROCHLORIDE 25 MG/1
25 TABLET, FILM COATED ORAL DAILY
Qty: 90 TABLET | Refills: 3 | Status: SHIPPED | OUTPATIENT
Start: 2024-02-16 | End: 2025-02-15

## 2024-02-16 RX ORDER — ROSUVASTATIN CALCIUM 5 MG/1
5 TABLET, COATED ORAL DAILY
Qty: 90 TABLET | Refills: 3 | Status: SHIPPED | OUTPATIENT
Start: 2024-02-16 | End: 2025-02-15

## 2024-02-16 RX ORDER — INSULIN PUMP SYRINGE, 3 ML
1 EACH MISCELLANEOUS 2 TIMES DAILY
Qty: 1 EACH | Refills: 0 | Status: SHIPPED | OUTPATIENT
Start: 2024-02-16 | End: 2024-03-25 | Stop reason: SDUPTHER

## 2024-02-16 RX ORDER — PANTOPRAZOLE SODIUM 40 MG/1
40 TABLET, DELAYED RELEASE ORAL DAILY
Qty: 90 TABLET | Refills: 3 | Status: SHIPPED | OUTPATIENT
Start: 2024-02-16 | End: 2025-02-15

## 2024-02-16 RX ORDER — LANCETS 33 GAUGE
1 EACH MISCELLANEOUS 2 TIMES DAILY
Qty: 100 EACH | Refills: 1 | Status: SHIPPED | OUTPATIENT
Start: 2024-02-16 | End: 2025-02-15

## 2024-02-16 RX ORDER — INSULIN PUMP SYRINGE, 3 ML
1 EACH MISCELLANEOUS 2 TIMES DAILY
COMMUNITY
End: 2024-02-16 | Stop reason: SDUPTHER

## 2024-02-16 RX ORDER — METFORMIN HYDROCHLORIDE 500 MG/1
500 TABLET, EXTENDED RELEASE ORAL DAILY
Qty: 90 TABLET | Refills: 3 | Status: SHIPPED | OUTPATIENT
Start: 2024-02-16 | End: 2024-02-22 | Stop reason: SDUPTHER

## 2024-02-16 RX ORDER — BLOOD SUGAR DIAGNOSTIC
1 STRIP MISCELLANEOUS 2 TIMES DAILY
Qty: 70 STRIP | Refills: 11 | Status: SHIPPED | OUTPATIENT
Start: 2024-02-16 | End: 2025-02-15

## 2024-02-16 NOTE — PROGRESS NOTES
Subjective   Patient ID: Hallie Demarco is a 58 y.o. female who presents for 3 month med check .    HPI   DM2: A1C 7.6% last check. Compliant with metformin, no SE. Blood sugar at home has been averaging close to 200s. Trial of 5 weeks of Trulicity and SE of significant nausea.  GERD: PPI effective for the most part, sometimes breakthrough heartburn. She avoids trigger foods/drinks, HOB is raised.  HYPERLIPIDEMIA: Lipids WNL last check. Compliant with statin, no SE.  BL HIP/BACK PAIN/BL LE NEUROPATHY: Fall on ice 2018, still w/residual pain/symptoms. Following with pain mgmt, receives injections. Has seen neurosurgeon Dr. Wright and does not think that neuropathy symptoms are from spine, EMG unremarkable June 2023. Dr. Wright thinks possible restless leg. Endorses nightly leg spasms and tingling with some mild burning BL LE, getting into hot bath helps, Requip 1mg has been helpful, does not completely resolve symptoms. Lyrica only somewhat effective. Following with pain mgmt, received injection recently which was not effective. Ropinirole 2mg has been helpful.  ANXIETY/DEPRESSION: Compliant w/sertraline, no SE, stable.  EDEMA: BL LE edema, trial of compression stockings somewhat helpful, but will redistribute fluid above top of stockings right below knees.     Hx of tx by Dr. Faria in [place] ([facility]) for non-RA factor positive RA. Then was told by another rheumatologist in [place] which said she did not have RA.      Dermatology, sees Dejan at [facility].  Hx of cystocele, s/p hysterectomy. [facility] health with [facility].  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 No further follow up.  Mammogram showed asymmetry 7/2023, US showed prob benign, will need to recheck this year.  Colonoscopy good 2015, due again 2025.    Review of Systems   Constitutional: Negative.    Respiratory: Negative.     Cardiovascular: Negative.    Gastrointestinal: Negative.        Objective   /78   Pulse 67  "  Ht 1.6 m (5' 3\")   Wt 83 kg (183 lb)   SpO2 97%   BMI 32.42 kg/m²     Physical Exam  Constitutional:       General: She is not in acute distress.     Appearance: Normal appearance. She is not ill-appearing.   HENT:      Head: Normocephalic and atraumatic.   Eyes:      Extraocular Movements: Extraocular movements intact.      Conjunctiva/sclera: Conjunctivae normal.   Cardiovascular:      Rate and Rhythm: Normal rate.   Pulmonary:      Effort: Pulmonary effort is normal.   Abdominal:      General: There is no distension.   Musculoskeletal:         General: Normal range of motion.      Cervical back: Normal range of motion.   Skin:     General: Skin is warm and dry.   Neurological:      General: No focal deficit present.      Mental Status: She is alert and oriented to person, place, and time.   Psychiatric:         Mood and Affect: Mood normal.         Behavior: Behavior normal.         Thought Content: Thought content normal.         Judgment: Judgment normal.         Assessment/Plan        Patient was identified as a fall risk. Risk prevention instructions provided.  A1C elevated, will discontinue Trulicity and Rx Ozempic, advised let me know if nausea and may consider Zofran.  No other medication changes today.  Labs good other than blood sugar.  Follow up 3 months with A1C prior.  "

## 2024-02-22 ENCOUNTER — TELEPHONE (OUTPATIENT)
Dept: PRIMARY CARE | Facility: CLINIC | Age: 59
End: 2024-02-22
Payer: COMMERCIAL

## 2024-02-22 DIAGNOSIS — E11.9 TYPE 2 DIABETES MELLITUS WITHOUT COMPLICATION, UNSPECIFIED WHETHER LONG TERM INSULIN USE (MULTI): ICD-10-CM

## 2024-02-22 DIAGNOSIS — R11.0 NAUSEA: Primary | ICD-10-CM

## 2024-02-22 RX ORDER — ONDANSETRON 4 MG/1
4 TABLET, ORALLY DISINTEGRATING ORAL EVERY 8 HOURS PRN
Qty: 20 TABLET | Refills: 0 | Status: SHIPPED | OUTPATIENT
Start: 2024-02-22 | End: 2024-02-29

## 2024-02-22 RX ORDER — METFORMIN HYDROCHLORIDE 500 MG/1
1000 TABLET, EXTENDED RELEASE ORAL DAILY
Qty: 180 TABLET | Refills: 3 | Status: SHIPPED | OUTPATIENT
Start: 2024-02-22 | End: 2025-02-21

## 2024-02-22 NOTE — TELEPHONE ENCOUNTER
Needing refill of metformin. Has been taking 2 tablets daily, but her bottle says to just take 1 a day. Asking for a call back.    negative

## 2024-03-12 ENCOUNTER — OFFICE VISIT (OUTPATIENT)
Dept: PRIMARY CARE | Facility: CLINIC | Age: 59
End: 2024-03-12
Payer: COMMERCIAL

## 2024-03-12 VITALS
SYSTOLIC BLOOD PRESSURE: 130 MMHG | HEIGHT: 63 IN | WEIGHT: 178 LBS | OXYGEN SATURATION: 98 % | BODY MASS INDEX: 31.54 KG/M2 | DIASTOLIC BLOOD PRESSURE: 82 MMHG | HEART RATE: 78 BPM

## 2024-03-12 DIAGNOSIS — K30 UPSET STOMACH: Primary | ICD-10-CM

## 2024-03-12 PROCEDURE — 99213 OFFICE O/P EST LOW 20 MIN: CPT

## 2024-03-12 PROCEDURE — 3075F SYST BP GE 130 - 139MM HG: CPT

## 2024-03-12 PROCEDURE — 3048F LDL-C <100 MG/DL: CPT

## 2024-03-12 PROCEDURE — 3051F HG A1C>EQUAL 7.0%<8.0%: CPT

## 2024-03-12 PROCEDURE — 3079F DIAST BP 80-89 MM HG: CPT

## 2024-03-12 PROCEDURE — 1036F TOBACCO NON-USER: CPT

## 2024-03-12 RX ORDER — HYOSCYAMINE SULFATE 0.12 MG/1
0.12 TABLET, ORALLY DISINTEGRATING ORAL EVERY 4 HOURS PRN
Qty: 24 TABLET | Refills: 0 | Status: SHIPPED | OUTPATIENT
Start: 2024-03-12 | End: 2024-03-16

## 2024-03-12 ASSESSMENT — ENCOUNTER SYMPTOMS
VOMITING: 0
CARDIOVASCULAR NEGATIVE: 1
RESPIRATORY NEGATIVE: 1
CONSTITUTIONAL NEGATIVE: 1
NAUSEA: 1

## 2024-03-12 ASSESSMENT — PATIENT HEALTH QUESTIONNAIRE - PHQ9
SUM OF ALL RESPONSES TO PHQ9 QUESTIONS 1 AND 2: 0
2. FEELING DOWN, DEPRESSED OR HOPELESS: NOT AT ALL
1. LITTLE INTEREST OR PLEASURE IN DOING THINGS: NOT AT ALL

## 2024-03-12 NOTE — PROGRESS NOTES
"Subjective   Patient ID: Hallie Demarco is a 58 y.o. female who presents for pt here for med check, having issues with Ozempic .    HPI   SE to both trulicity and Ozempic of significant nausea and bloating with excessive gas, has tried Mylanta and taking her usual Protonix as well as Zofran and not very effective, no fever    Review of Systems   Constitutional: Negative.    Respiratory: Negative.     Cardiovascular: Negative.    Gastrointestinal:  Positive for nausea. Negative for vomiting.       Objective   /82   Pulse 78   Ht 1.6 m (5' 3\")   Wt 80.7 kg (178 lb)   SpO2 98%   BMI 31.53 kg/m²     Physical Exam  Constitutional:       General: She is not in acute distress.     Appearance: Normal appearance. She is not ill-appearing.   HENT:      Head: Normocephalic and atraumatic.   Eyes:      Extraocular Movements: Extraocular movements intact.      Conjunctiva/sclera: Conjunctivae normal.   Cardiovascular:      Rate and Rhythm: Normal rate.   Pulmonary:      Effort: Pulmonary effort is normal.   Abdominal:      General: There is no distension.   Musculoskeletal:         General: Normal range of motion.      Cervical back: Normal range of motion.   Skin:     General: Skin is warm and dry.   Neurological:      General: No focal deficit present.      Mental Status: She is alert and oriented to person, place, and time.   Psychiatric:         Mood and Affect: Mood normal.         Behavior: Behavior normal.         Thought Content: Thought content normal.         Judgment: Judgment normal.         Assessment/Plan       Discontinue Ozempic   Double Protonix to 40mg daily, advised otc gas-x, Rx hyoscyamine prn  Call if symptoms not resolving  Follow up at me check in about 2 months  "

## 2024-03-13 ENCOUNTER — TELEPHONE (OUTPATIENT)
Dept: PAIN MEDICINE | Facility: CLINIC | Age: 59
End: 2024-03-13
Payer: COMMERCIAL

## 2024-03-13 DIAGNOSIS — M54.16 LUMBAR NEURITIS: ICD-10-CM

## 2024-03-13 RX ORDER — PREGABALIN 100 MG/1
100 CAPSULE ORAL 2 TIMES DAILY
Qty: 60 CAPSULE | Refills: 0 | Status: SHIPPED | OUTPATIENT
Start: 2024-03-13 | End: 2024-04-18 | Stop reason: SDUPTHER

## 2024-03-13 NOTE — TELEPHONE ENCOUNTER
Patient called and requested a refill of lyrica.  Her last appointment in the office was 09/05/23.  She had an injection with Dr. Glass in October.  I called and scheduled patient for a follow up appointment on 04/10/24.  She is aware she needs to come to that for additional refills.  Can you give her a refill to get her through until then?  Please advise.  Thanks!   Patient with one or more new problems requiring additional work-up/treatment.

## 2024-03-25 ENCOUNTER — TELEPHONE (OUTPATIENT)
Dept: PRIMARY CARE | Facility: CLINIC | Age: 59
End: 2024-03-25
Payer: COMMERCIAL

## 2024-03-25 DIAGNOSIS — E11.9 TYPE 2 DIABETES MELLITUS WITHOUT COMPLICATION, UNSPECIFIED WHETHER LONG TERM INSULIN USE (MULTI): ICD-10-CM

## 2024-03-25 RX ORDER — IBUPROFEN 200 MG
1 CAPSULE ORAL 2 TIMES DAILY
COMMUNITY
End: 2024-03-25 | Stop reason: SDUPTHER

## 2024-03-26 RX ORDER — IBUPROFEN 200 MG
1 CAPSULE ORAL 2 TIMES DAILY
Qty: 200 STRIP | Refills: 3 | Status: SHIPPED | OUTPATIENT
Start: 2024-03-26 | End: 2025-03-26

## 2024-03-26 RX ORDER — INSULIN PUMP SYRINGE, 3 ML
1 EACH MISCELLANEOUS 2 TIMES DAILY
Qty: 1 EACH | Refills: 0 | Status: SHIPPED | OUTPATIENT
Start: 2024-03-26

## 2024-04-10 ENCOUNTER — APPOINTMENT (OUTPATIENT)
Dept: PAIN MEDICINE | Facility: CLINIC | Age: 59
End: 2024-04-10
Payer: COMMERCIAL

## 2024-04-18 ENCOUNTER — OFFICE VISIT (OUTPATIENT)
Dept: PAIN MEDICINE | Facility: CLINIC | Age: 59
End: 2024-04-18
Payer: COMMERCIAL

## 2024-04-18 VITALS — HEART RATE: 77 BPM | DIASTOLIC BLOOD PRESSURE: 85 MMHG | SYSTOLIC BLOOD PRESSURE: 151 MMHG | RESPIRATION RATE: 16 BRPM

## 2024-04-18 DIAGNOSIS — M51.26 LUMBAR DISC HERNIATION: ICD-10-CM

## 2024-04-18 DIAGNOSIS — M54.41 CHRONIC BILATERAL LOW BACK PAIN WITH BILATERAL SCIATICA: ICD-10-CM

## 2024-04-18 DIAGNOSIS — M54.16 LUMBAR BACK PAIN WITH RADICULOPATHY AFFECTING RIGHT LOWER EXTREMITY: Primary | ICD-10-CM

## 2024-04-18 DIAGNOSIS — M51.36 BULGING LUMBAR DISC: ICD-10-CM

## 2024-04-18 DIAGNOSIS — M54.42 CHRONIC BILATERAL LOW BACK PAIN WITH BILATERAL SCIATICA: ICD-10-CM

## 2024-04-18 DIAGNOSIS — M54.16 LUMBAR NEURITIS: ICD-10-CM

## 2024-04-18 DIAGNOSIS — G89.29 CHRONIC BILATERAL LOW BACK PAIN WITH BILATERAL SCIATICA: ICD-10-CM

## 2024-04-18 PROCEDURE — 99214 OFFICE O/P EST MOD 30 MIN: CPT | Performed by: PHYSICIAN ASSISTANT

## 2024-04-18 RX ORDER — PREGABALIN 100 MG/1
100 CAPSULE ORAL 2 TIMES DAILY
Qty: 60 CAPSULE | Refills: 0 | Status: SHIPPED | OUTPATIENT
Start: 2024-04-18

## 2024-04-18 ASSESSMENT — ENCOUNTER SYMPTOMS
GASTROINTESTINAL NEGATIVE: 1
ALLERGIC/IMMUNOLOGIC NEGATIVE: 1
ENDOCRINE NEGATIVE: 1
BACK PAIN: 1
HEMATOLOGIC/LYMPHATIC NEGATIVE: 1
PSYCHIATRIC NEGATIVE: 1
CARDIOVASCULAR NEGATIVE: 1
MYALGIAS: 1
WEAKNESS: 1
CONSTITUTIONAL NEGATIVE: 1
ARTHRALGIAS: 1
RESPIRATORY NEGATIVE: 1
EYES NEGATIVE: 1
NUMBNESS: 1

## 2024-04-18 ASSESSMENT — PATIENT HEALTH QUESTIONNAIRE - PHQ9
2. FEELING DOWN, DEPRESSED OR HOPELESS: NOT AT ALL
1. LITTLE INTEREST OR PLEASURE IN DOING THINGS: NOT AT ALL
SUM OF ALL RESPONSES TO PHQ9 QUESTIONS 1 AND 2: 0

## 2024-04-18 ASSESSMENT — COLUMBIA-SUICIDE SEVERITY RATING SCALE - C-SSRS
1. IN THE PAST MONTH, HAVE YOU WISHED YOU WERE DEAD OR WISHED YOU COULD GO TO SLEEP AND NOT WAKE UP?: NO
2. HAVE YOU ACTUALLY HAD ANY THOUGHTS OF KILLING YOURSELF?: NO
6. HAVE YOU EVER DONE ANYTHING, STARTED TO DO ANYTHING, OR PREPARED TO DO ANYTHING TO END YOUR LIFE?: NO

## 2024-04-18 NOTE — PROGRESS NOTES
Subjective   Patient ID: Hallie Demarco is a 58 y.o. female who presents for Med Refill (FUV Meds  today she reports having pain in her bilat lower back that radiates into bilat hips, legs and feet rates her pain score 5/10 now and 7/10 with activity, describes as aching, sharp in her rt thigh and  gets more intense with increased pain. She stretches, changes positions, takes tylenol, and leg cramping pills these help mange her pain short term. ) She wants an injection but her AC1 Hgb is still high 7.6 she is trying a new medication to see if it will help. She will need a RF on Lyrica send to [facility].   ZULY score 50%Falls n/a for age, Depression and smoking negative.     Lashawn Fisher RN 04/18/24 10:18 AM     Patient is a 58-year-old female.  She presents today for a 6-month medication management follow-up.  She has a history of L4-5 epidural.  The 2 done with Kenalog worked much better than the most recent done with dexamethasone.  At this time she is having lower back pain with bilateral radiating leg pain.  This is down to her feet.  She would like to possibly consider another injection but right now, she is having an issue with her blood sugar and she wants to get this taken care of first and foremost.  She has leg cramps and pain.  Worse with activity.  It is a 5-7/10.  It is a sharp stabbing type pain in her thighs.  Sitting and changing positions does make it somewhat better.  As well as stretching and Tylenol.  Unfortunate, it is not quite where she wants to be.  She uses Lyrica.  100 mg twice daily.  This gives her improvement as well.  She tolerates the medication.  No side effects.  She takes them as prescribed.        Review of Systems   Constitutional: Negative.    HENT: Negative.     Eyes: Negative.    Respiratory: Negative.     Cardiovascular: Negative.    Gastrointestinal: Negative.    Endocrine: Negative.    Genitourinary: Negative.    Musculoskeletal:  Positive for arthralgias, back pain, gait  problem and myalgias.   Skin: Negative.    Allergic/Immunologic: Negative.    Neurological:  Positive for weakness and numbness.   Hematological: Negative.    Psychiatric/Behavioral: Negative.         Objective   Physical Exam  Vitals and nursing note reviewed.   Constitutional:       Appearance: Normal appearance.   HENT:      Head: Normocephalic and atraumatic.      Right Ear: External ear normal.      Left Ear: External ear normal.      Nose: Nose normal.      Mouth/Throat:      Pharynx: Oropharynx is clear.   Eyes:      Conjunctiva/sclera: Conjunctivae normal.   Cardiovascular:      Rate and Rhythm: Normal rate and regular rhythm.      Pulses: Normal pulses.   Pulmonary:      Effort: Pulmonary effort is normal.   Musculoskeletal:         General: Normal range of motion.      Cervical back: Normal range of motion.      Comments: 5/5 lower extremity strength other than bilateral hip flexion, ADF and EHL 5 -/5   Skin:     General: Skin is warm and dry.   Neurological:      General: No focal deficit present.      Mental Status: She is alert and oriented to person, place, and time. Mental status is at baseline.   Psychiatric:         Mood and Affect: Mood normal.         Behavior: Behavior normal.         Thought Content: Thought content normal.         Judgment: Judgment normal.         Assessment/Plan   Diagnoses and all orders for this visit:  Lumbar back pain with radiculopathy affecting right lower extremity  Lumbar neuritis  -     pregabalin (Lyrica) 100 mg capsule; Take 1 capsule (100 mg) by mouth 2 times a day.  Bulging lumbar disc  Lumbar disc herniation  Chronic bilateral low back pain with bilateral sciatica       Patient is a 58-year-old female with a past medical history significant for lumbar degenerative disease, lumbar disc bulge, lumbar neuritis and chronic pain.  At this time, she would like to possibly consider her injection options but she is having issues with her blood sugars.  She wants to get  this taken care of first and foremost.  While she waits to get her blood sugars taking care of she would like to continue on her Lyrica.  100 mg twice daily.  OARRS reviewed.  She is requesting a refill.  This will be sent to her pharmacy.  She is aware to continue on the medication and she is going to follow-up in 6 months per her request.  Should she get her blood sugars better under control and she decides she does want to pursue an injection she will call us.  Otherwise follow-up as mentioned.

## 2024-05-09 ENCOUNTER — APPOINTMENT (OUTPATIENT)
Dept: PRIMARY CARE | Facility: CLINIC | Age: 59
End: 2024-05-09
Payer: COMMERCIAL

## 2024-05-16 ENCOUNTER — APPOINTMENT (OUTPATIENT)
Dept: PRIMARY CARE | Facility: CLINIC | Age: 59
End: 2024-05-16
Payer: COMMERCIAL

## 2024-05-31 ENCOUNTER — APPOINTMENT (OUTPATIENT)
Dept: PRIMARY CARE | Facility: CLINIC | Age: 59
End: 2024-05-31
Payer: COMMERCIAL

## 2024-06-10 ENCOUNTER — LAB (OUTPATIENT)
Dept: LAB | Facility: LAB | Age: 59
End: 2024-06-10
Payer: COMMERCIAL

## 2024-06-10 DIAGNOSIS — E11.9 TYPE 2 DIABETES MELLITUS WITHOUT COMPLICATION, UNSPECIFIED WHETHER LONG TERM INSULIN USE (MULTI): ICD-10-CM

## 2024-06-10 LAB
EST. AVERAGE GLUCOSE BLD GHB EST-MCNC: 171 MG/DL
HBA1C MFR BLD: 7.6 %

## 2024-06-10 PROCEDURE — 83036 HEMOGLOBIN GLYCOSYLATED A1C: CPT

## 2024-06-10 PROCEDURE — 36415 COLL VENOUS BLD VENIPUNCTURE: CPT

## 2024-06-13 ENCOUNTER — APPOINTMENT (OUTPATIENT)
Dept: PRIMARY CARE | Facility: CLINIC | Age: 59
End: 2024-06-13
Payer: COMMERCIAL

## 2024-06-13 VITALS
DIASTOLIC BLOOD PRESSURE: 90 MMHG | HEART RATE: 73 BPM | WEIGHT: 176 LBS | BODY MASS INDEX: 31.18 KG/M2 | HEIGHT: 63 IN | SYSTOLIC BLOOD PRESSURE: 138 MMHG | OXYGEN SATURATION: 97 %

## 2024-06-13 DIAGNOSIS — Z00.00 ROUTINE GENERAL MEDICAL EXAMINATION AT HEALTH CARE FACILITY: Primary | ICD-10-CM

## 2024-06-13 DIAGNOSIS — M54.16 LUMBAR NEURITIS: ICD-10-CM

## 2024-06-13 DIAGNOSIS — E78.5 HYPERLIPIDEMIA, UNSPECIFIED HYPERLIPIDEMIA TYPE: ICD-10-CM

## 2024-06-13 DIAGNOSIS — E11.9 TYPE 2 DIABETES MELLITUS WITHOUT COMPLICATION, UNSPECIFIED WHETHER LONG TERM INSULIN USE (MULTI): ICD-10-CM

## 2024-06-13 DIAGNOSIS — R79.89 LOW VITAMIN B12 LEVEL: ICD-10-CM

## 2024-06-13 DIAGNOSIS — E67.8 EXCESSIVE VITAMIN B6 INTAKE: ICD-10-CM

## 2024-06-13 DIAGNOSIS — Z13.29 SCREENING FOR THYROID DISORDER: ICD-10-CM

## 2024-06-13 DIAGNOSIS — R60.0 LOCALIZED EDEMA: ICD-10-CM

## 2024-06-13 DIAGNOSIS — K21.9 CHRONIC GERD: ICD-10-CM

## 2024-06-13 DIAGNOSIS — Z86.005 HISTORY OF CARCINOMA IN SITU OF LARYNX: ICD-10-CM

## 2024-06-13 PROCEDURE — 99214 OFFICE O/P EST MOD 30 MIN: CPT

## 2024-06-13 PROCEDURE — 1036F TOBACCO NON-USER: CPT

## 2024-06-13 PROCEDURE — 3051F HG A1C>EQUAL 7.0%<8.0%: CPT

## 2024-06-13 PROCEDURE — 3080F DIAST BP >= 90 MM HG: CPT

## 2024-06-13 PROCEDURE — G0439 PPPS, SUBSEQ VISIT: HCPCS

## 2024-06-13 PROCEDURE — 3048F LDL-C <100 MG/DL: CPT

## 2024-06-13 PROCEDURE — 3075F SYST BP GE 130 - 139MM HG: CPT

## 2024-06-13 ASSESSMENT — ENCOUNTER SYMPTOMS
CONSTITUTIONAL NEGATIVE: 1
RESPIRATORY NEGATIVE: 1
CARDIOVASCULAR NEGATIVE: 1
GASTROINTESTINAL NEGATIVE: 1

## 2024-06-13 ASSESSMENT — ACTIVITIES OF DAILY LIVING (ADL)
BATHING: INDEPENDENT
MANAGING_FINANCES: INDEPENDENT
DRESSING: INDEPENDENT
TAKING_MEDICATION: INDEPENDENT
DOING_HOUSEWORK: INDEPENDENT
GROCERY_SHOPPING: INDEPENDENT

## 2024-06-13 NOTE — PROGRESS NOTES
"Subjective   Patient ID: Hallie Demarco is a 58 y.o. female who presents for Medicare Annual Wellness Visit Subsequent (Pt here for med check,).    HPI     Review of Systems    Objective   /90   Pulse 73   Ht 1.6 m (5' 3\")   Wt 79.8 kg (176 lb)   SpO2 97%   BMI 31.18 kg/m²     Physical Exam    Assessment/Plan          "

## 2024-06-13 NOTE — PROGRESS NOTES
Subjective   Reason for Visit: Hallie Demarco is an 58 y.o. female here for a Medicare Wellness visit.     Past Medical, Surgical, and Family History reviewed and updated in chart.    Reviewed all medications by prescribing practitioner or clinical pharmacist (such as prescriptions, OTCs, herbal therapies and supplements) and documented in the medical record.    HPI  DM2: A1C 7.6% last check. Compliant with metformin, no SE. Blood sugar at home has been averaging close to 200s. Trial of Trulicity and Ozempic and SE of significant nausea.  GERD: Last visit doubled daily PPI, this helped, stable currently.  HYPERLIPIDEMIA: Lipids WNL last check. Compliant with statin, no SE.  BL HIP/BACK PAIN/BL LE NEUROPATHY: Fall on ice 2018, still w/residual pain/symptoms. Following with pain mgmt, receives injections. Has seen neurosurgeon Dr. Wright and does not think that neuropathy symptoms are from spine, EMG unremarkable June 2023. Dr. Wright thinks possible restless leg. Endorses nightly leg spasms and tingling with some mild burning BL LE, getting into hot bath helps, Requip 2mg has been helpful, does not completely resolve symptoms. Lyrica only somewhat effective. Following with pain mgmt. Ropinirole 2mg has been helpful.  ANXIETY/DEPRESSION: Compliant w/sertraline, no SE, stable.  EDEMA: BL LE edema, chronic, compression stockings prn.     Hx of tx by Dr. Faria in [place] ([facility] [place]) for non-RA factor positive RA. Then was told by another rheumatologist in [place] which said she did not have RA.      Dermatology, sees Dejan at [facility].  Hx of cystocele, s/p hysterectomy. [facility] with [facility].  Hx of vocal cord cancer, follows with raiz Azevedo. No further follow up.  Mammogram showed asymmetry 7/2023, US showed prob benign, will need to recheck this year.  Colonoscopy good 2015, due again 2025.    Patient Care Team:  Rosales Zamarripa PA-C as PCP - General  Valentino Carter MD as PCP - [facility]  "Medicare Advantage PCP  Valentino Carter MD as PCP - [facility] Medicare Advantage PCP     Review of Systems   Constitutional: Negative.    Respiratory: Negative.     Cardiovascular: Negative.    Gastrointestinal: Negative.        Objective   Vitals:  /90   Pulse 73   Ht 1.6 m (5' 3\")   Wt 79.8 kg (176 lb)   SpO2 97%   BMI 31.18 kg/m²       Physical Exam  Constitutional:       General: She is not in acute distress.     Appearance: Normal appearance. She is not ill-appearing.   HENT:      Head: Normocephalic and atraumatic.   Eyes:      Extraocular Movements: Extraocular movements intact.      Conjunctiva/sclera: Conjunctivae normal.   Cardiovascular:      Rate and Rhythm: Normal rate.   Pulmonary:      Effort: Pulmonary effort is normal.   Abdominal:      General: There is no distension.   Musculoskeletal:         General: Normal range of motion.      Cervical back: Normal range of motion.   Skin:     General: Skin is warm and dry.   Neurological:      General: No focal deficit present.      Mental Status: She is alert and oriented to person, place, and time.   Psychiatric:         Mood and Affect: Mood normal.         Behavior: Behavior normal.         Thought Content: Thought content normal.         Judgment: Judgment normal.         Assessment/Plan   Problem List Items Addressed This Visit    None       Decrease Protonix back to 40mg daily.  Rx Jardiance 10mg daily.  Follow up 3 months with fasting labs prior.  "

## 2024-07-02 ENCOUNTER — TELEPHONE (OUTPATIENT)
Dept: PAIN MEDICINE | Facility: CLINIC | Age: 59
End: 2024-07-02
Payer: COMMERCIAL

## 2024-07-02 DIAGNOSIS — M54.16 LUMBAR NEURITIS: ICD-10-CM

## 2024-07-02 RX ORDER — PREGABALIN 100 MG/1
100 CAPSULE ORAL 2 TIMES DAILY
Qty: 60 CAPSULE | Refills: 0 | Status: SHIPPED | OUTPATIENT
Start: 2024-07-02

## 2024-07-08 ENCOUNTER — HOSPITAL ENCOUNTER (OUTPATIENT)
Dept: RADIOLOGY | Facility: HOSPITAL | Age: 59
Discharge: HOME | End: 2024-07-08
Payer: COMMERCIAL

## 2024-07-08 VITALS — WEIGHT: 170 LBS | BODY MASS INDEX: 31.28 KG/M2 | HEIGHT: 62 IN

## 2024-07-08 DIAGNOSIS — R92.8 ABNORMAL MAMMOGRAM: ICD-10-CM

## 2024-07-08 PROCEDURE — 77066 DX MAMMO INCL CAD BI: CPT | Performed by: RADIOLOGY

## 2024-07-08 PROCEDURE — 77062 BREAST TOMOSYNTHESIS BI: CPT

## 2024-07-08 PROCEDURE — 77062 BREAST TOMOSYNTHESIS BI: CPT | Performed by: RADIOLOGY

## 2024-08-07 ENCOUNTER — TELEPHONE (OUTPATIENT)
Dept: PAIN MEDICINE | Facility: CLINIC | Age: 59
End: 2024-08-07
Payer: COMMERCIAL

## 2024-08-07 DIAGNOSIS — M54.16 LUMBAR NEURITIS: ICD-10-CM

## 2024-08-07 RX ORDER — PREGABALIN 100 MG/1
100 CAPSULE ORAL 2 TIMES DAILY
Qty: 60 CAPSULE | Refills: 2 | Status: SHIPPED | OUTPATIENT
Start: 2024-08-07

## 2024-08-07 NOTE — TELEPHONE ENCOUNTER
Hallie called in for a RF on her Lyrica 100mg 1 BID send to  [facility]. Last OV 4/18/24, next FUV 10/8/24.

## 2024-08-20 ENCOUNTER — OFFICE VISIT (OUTPATIENT)
Dept: PAIN MEDICINE | Facility: CLINIC | Age: 59
End: 2024-08-20
Payer: COMMERCIAL

## 2024-08-20 VITALS
DIASTOLIC BLOOD PRESSURE: 77 MMHG | HEART RATE: 85 BPM | BODY MASS INDEX: 31.28 KG/M2 | SYSTOLIC BLOOD PRESSURE: 125 MMHG | RESPIRATION RATE: 16 BRPM | WEIGHT: 171 LBS

## 2024-08-20 DIAGNOSIS — M51.26 LUMBAR DISC HERNIATION: ICD-10-CM

## 2024-08-20 DIAGNOSIS — M54.16 LUMBAR NEURITIS: ICD-10-CM

## 2024-08-20 DIAGNOSIS — M51.36 BULGING LUMBAR DISC: ICD-10-CM

## 2024-08-20 DIAGNOSIS — M54.16 LUMBAR BACK PAIN WITH RADICULOPATHY AFFECTING RIGHT LOWER EXTREMITY: Primary | ICD-10-CM

## 2024-08-20 PROCEDURE — 99214 OFFICE O/P EST MOD 30 MIN: CPT | Performed by: PHYSICIAN ASSISTANT

## 2024-08-20 RX ORDER — TRIAMCINOLONE ACETONIDE 40 MG/ML
40 INJECTION, SUSPENSION INTRA-ARTICULAR; INTRAMUSCULAR ONCE
OUTPATIENT
Start: 2024-08-20 | End: 2024-08-20

## 2024-08-20 RX ORDER — SODIUM CHLORIDE, SODIUM LACTATE, POTASSIUM CHLORIDE, CALCIUM CHLORIDE 600; 310; 30; 20 MG/100ML; MG/100ML; MG/100ML; MG/100ML
20 INJECTION, SOLUTION INTRAVENOUS CONTINUOUS
OUTPATIENT
Start: 2024-08-20

## 2024-08-20 RX ORDER — PREGABALIN 100 MG/1
100 CAPSULE ORAL 2 TIMES DAILY
Qty: 60 CAPSULE | Refills: 2 | Status: SHIPPED | OUTPATIENT
Start: 2024-08-20

## 2024-08-20 RX ORDER — LIDOCAINE HYDROCHLORIDE 20 MG/ML
6 INJECTION, SOLUTION EPIDURAL; INFILTRATION; INTRACAUDAL; PERINEURAL ONCE
OUTPATIENT
Start: 2024-08-20 | End: 2024-08-20

## 2024-08-20 RX ORDER — SODIUM CHLORIDE 9 MG/ML
4 INJECTION, SOLUTION INTRAMUSCULAR; INTRAVENOUS; SUBCUTANEOUS ONCE
OUTPATIENT
Start: 2024-08-20 | End: 2024-08-20

## 2024-08-20 ASSESSMENT — ENCOUNTER SYMPTOMS
ENDOCRINE NEGATIVE: 1
GASTROINTESTINAL NEGATIVE: 1
EYES NEGATIVE: 1
ARTHRALGIAS: 1
CONSTITUTIONAL NEGATIVE: 1
CARDIOVASCULAR NEGATIVE: 1
PSYCHIATRIC NEGATIVE: 1
MYALGIAS: 1
WEAKNESS: 1
RESPIRATORY NEGATIVE: 1
ALLERGIC/IMMUNOLOGIC NEGATIVE: 1
BACK PAIN: 1
NUMBNESS: 1
HEMATOLOGIC/LYMPHATIC NEGATIVE: 1

## 2024-08-20 NOTE — H&P (VIEW-ONLY)
Subjective   Patient ID: Hallie Demarco is a 59 y.o. female who presents for Follow-up ([facility] meds. Today reports having pain in her bilat lower back that goes into her bilat gluteals, down bilat legs and on the top of her bilat feet rates pain score 5/10 now and 6/10 at worst with standing or sitting, describes as an electric shock and dull numbness and tingling in her bilat legs and feet  pain. She is taking Lyrica without side effects reported. She is here to discuss an injection her PCP said her blood sugar is better. )ZULY score 53%, SOAPP score 1.   She will need a RF on Lyrica send to [facility].     Lashawn Fisher RN 08/20/24 1:41 PM     Patient is a 59-year-old female.  She presents today for an earlier than scheduled 6-month medication management follow-up.  She has a history of L4-5 epidural.  The 2 done with Kenalog worked much better than the most recent done with dexamethasone.  She is here today to discuss repeating this.  She states that her blood sugars are much better under control.  They run between 135-145.  She is eager to do this again.  She is lower back pain with bilateral radiating leg pain that goes down to her feet.  She rates it a 5-6/10.  Worse with standing, worse with being upright, worse with being active.  It feels like an electrical type sensation and she is eager to get another injection.\  She uses Lyrica.  100 mg twice daily.  This gives her improvement as well.  She tolerates the medication.  No side effects.  She takes them as prescribed.  She is requesting a refill today.          Review of Systems   Constitutional: Negative.    HENT: Negative.     Eyes: Negative.    Respiratory: Negative.     Cardiovascular: Negative.    Gastrointestinal: Negative.    Endocrine: Negative.    Genitourinary: Negative.    Musculoskeletal:  Positive for arthralgias, back pain, gait problem and myalgias.   Skin: Negative.    Allergic/Immunologic: Negative.    Neurological:  Positive for weakness and  numbness.   Hematological: Negative.    Psychiatric/Behavioral: Negative.         Objective   Physical Exam  Vitals and nursing note reviewed.   Constitutional:       General: She is not in acute distress.     Appearance: Normal appearance. She is not ill-appearing.   HENT:      Head: Normocephalic and atraumatic.      Right Ear: External ear normal.      Left Ear: External ear normal.      Nose: Nose normal.      Mouth/Throat:      Pharynx: Oropharynx is clear.   Eyes:      Conjunctiva/sclera: Conjunctivae normal.   Cardiovascular:      Rate and Rhythm: Normal rate and regular rhythm.      Pulses: Normal pulses.   Pulmonary:      Effort: Pulmonary effort is normal.      Breath sounds: Normal breath sounds.   Musculoskeletal:         General: Normal range of motion.      Cervical back: Normal range of motion.      Comments: 5/5 lower extremity strength other than bilateral knee extension 4+/5   Skin:     General: Skin is warm and dry.   Neurological:      General: No focal deficit present.      Mental Status: She is alert and oriented to person, place, and time. Mental status is at baseline.   Psychiatric:         Mood and Affect: Mood normal.         Behavior: Behavior normal.         Thought Content: Thought content normal.         Judgment: Judgment normal.         MR LUMBAR SPINE WO IV CONTRAST  Status: Final result     PACS Images     Show images for MR LUMBAR SPINE WO IV CONTRAST  Signed by    Signed Time Phone Pager   Shay Meyer 1/06/2023 16:04 [phone]      Exam Information    Status Exam Begun Exam Ended   Final 1/06/2023 13:18 1/06/2023 14:12     Study Result    Narrative & Impression   MRN: [id number]  Patient Name: JOSIE ORTEGA     STUDY:  MRI L-SPINE WO;  1/6/2023 2:12 pm     INDICATION:  Chronic lower back pain with bilateral leg tingling and right leg  weakness. Pain radiates into bilateral gluteal, left groin, and  bilateral anterior/posterior legs into the top of her foot.      COMPARISON:  10/25/2019     ACCESSION NUMBER(S):  [id number]     ORDERING CLINICIAN:  MAL CORLEY     TECHNIQUE:  Sagittal T1, T2, STIR, axial T1 and T2 weighted images of the lumbar  spine were acquired.     FINDINGS:  Transitional anatomy characterized by 6 lumbar-type vertebrae with  partial lumbarization of S1. The last well-formed disc space will be  considered S1-2 corresponding to axial series 11, image 18.     Alignment: The alignment is unremarkable.     Vertebral bodies demonstrate expected height without abnormal marrow  signal. Mild multilevel degenerative changes worst at the L5 superior  endplate. Normal disc height and T2 signal of the intervertebral  discs.     The conus terminates at L1 and appears unremarkable in appearance.     T12-L1: Only imaged in the sagittal plane. There is no posterior disc  contour abnormality. There is no spinal canal stenosis or neural  foramina narrowing.     L1-L2: There is no posterior disc contour abnormality. There is no  spinal canal stenosis or neural foraminal narrowing. There is mild  bilateral facet osteoarthropathy, worse on the right.     L2-L3: There is no posterior disc contour abnormality. There is no  spinal canal stenosis or neural foraminal narrowing. There is fluid  within the bilateral facet joints.     L3-L4: There is no posterior disc contour abnormality. There is no  spinal canal stenosis or neural foraminal narrowing. There is fluid  within the left facet joint with worse osteoarthropathy in the right  facet joint.     L4-L5: There is mild posterior disc bulge asymmetric to the right  with the extraforaminal segment contacting the right L4 nerve root.  There is no spinal canal stenosis or left neural foraminal narrowing.  There is mild bilateral facet osteoarthropathy, worse on the right.  There is fluid within the bilateral facet joint, worse on the left.     L5-S1: There is no posterior disc contour abnormality. There is no  spinal canal  stenosis or neural foraminal narrowing. There is mild  bilateral facet osteoarthropathy.     The prevertebral and posterior paraspinous soft tissues are  unremarkable.     IMPRESSION:  Transitional anatomy characterized by 6 lumbar-type vertebrae with  partial lumbarization of S1. The last well-formed disc space will be  considered S1-2 corresponding to axial series 11, image 18.     Disc bulge asymmetric to the right at L4-L5 with the extraforaminal  component contacting the exiting right L4 nerve root. Additional mild  multilevel degenerative changes as described. Findings are stable in  appearance compared to prior MRI 10/25/2019.     I personally reviewed the images/study and I agree with the findings  as stated. This study was interpreted at [place], [place].     Assessment/Plan   Diagnoses and all orders for this visit:  Lumbar back pain with radiculopathy affecting right lower extremity  -     Epidural Steroid Injection; Future  -     FL pain management; Future  -     triamcinolone acetonide (Kenalog-40) injection 40 mg  Lumbar neuritis  -     pregabalin (Lyrica) 100 mg capsule; Take 1 capsule (100 mg) by mouth 2 times a day.  -     Epidural Steroid Injection; Future  -     FL pain management; Future  -     triamcinolone acetonide (Kenalog-40) injection 40 mg  Bulging lumbar disc  Lumbar disc herniation  -     Epidural Steroid Injection; Future  -     FL pain management; Future  -     triamcinolone acetonide (Kenalog-40) injection 40 mg  Other orders  -     NPO Diet Except: Sips with meds; Effective now; Standing  -     Height and weight; Standing  -     Insert and maintain peripheral IV; Standing  -     Saline lock IV; Standing  -     POCT Glucose; Standing  -     Type And Screen; Standing  -     lactated Ringer's infusion  -     Adult diet Regular; Standing  -     Vital Signs; Standing  -     Notify physician - Standard Parameters; Standing  -     Continue IV  fluids ordered pre-procedure; Standing  -     Prior to Discharge O2 Weaning; Standing  -     Pulse oximetry, continuous; Standing  -     Discharge patient; Standing  -     iohexol (OMNIPaque) 300 mg iodine/mL solution 3 mL  -     lidocaine PF (Xylocaine) 20 mg/mL (2 %) injection 120 mg  -     sodium chloride (PF) 0.9% solution 4 mL       Patient is a 59-year-old female with the above-mentioned medical diagnoses following up today to discuss having another injection as her blood sugars have been much better under control.  She is a history of L4-5 epidural steroid injection done with triamcinolone that worked the best.  The other 1 that was done most recently was done with a different steroid and she is very insistent that the 1 done with triamcinolone worked the best.  We reviewed her MRI.  We discussed repeating the L4-5 epidural steroid injection under fluoroscopy for both diagnostic and therapeutic purposes.  Procedure was discussed.  Risk and benefits were discussed.  Patient is agreeable.  She is going to follow-up to set the injection for reevaluation.  She will call clinic in the interim should she require anything from our services.  In the meantime she will continue on Lyrica.  OARRS reviewed.  Refill sent.

## 2024-08-20 NOTE — PROGRESS NOTES
Subjective   Patient ID: Hallie Demarco is a 59 y.o. female who presents for Follow-up ([facility] meds. Today reports having pain in her bilat lower back that goes into her bilat gluteals, down bilat legs and on the top of her bilat feet rates pain score 5/10 now and 6/10 at worst with standing or sitting, describes as an electric shock and dull numbness and tingling in her bilat legs and feet  pain. She is taking Lyrica without side effects reported. She is here to discuss an injection her PCP said her blood sugar is better. )ZULY score 53%, SOAPP score 1.   She will need a RF on Lyrica send to [facility].     Lashawn Fisher RN 08/20/24 1:41 PM     Patient is a 59-year-old female.  She presents today for an earlier than scheduled 6-month medication management follow-up.  She has a history of L4-5 epidural.  The 2 done with Kenalog worked much better than the most recent done with dexamethasone.  She is here today to discuss repeating this.  She states that her blood sugars are much better under control.  They run between 135-145.  She is eager to do this again.  She is lower back pain with bilateral radiating leg pain that goes down to her feet.  She rates it a 5-6/10.  Worse with standing, worse with being upright, worse with being active.  It feels like an electrical type sensation and she is eager to get another injection.\  She uses Lyrica.  100 mg twice daily.  This gives her improvement as well.  She tolerates the medication.  No side effects.  She takes them as prescribed.  She is requesting a refill today.          Review of Systems   Constitutional: Negative.    HENT: Negative.     Eyes: Negative.    Respiratory: Negative.     Cardiovascular: Negative.    Gastrointestinal: Negative.    Endocrine: Negative.    Genitourinary: Negative.    Musculoskeletal:  Positive for arthralgias, back pain, gait problem and myalgias.   Skin: Negative.    Allergic/Immunologic: Negative.    Neurological:  Positive for weakness and  numbness.   Hematological: Negative.    Psychiatric/Behavioral: Negative.         Objective   Physical Exam  Vitals and nursing note reviewed.   Constitutional:       General: She is not in acute distress.     Appearance: Normal appearance. She is not ill-appearing.   HENT:      Head: Normocephalic and atraumatic.      Right Ear: External ear normal.      Left Ear: External ear normal.      Nose: Nose normal.      Mouth/Throat:      Pharynx: Oropharynx is clear.   Eyes:      Conjunctiva/sclera: Conjunctivae normal.   Cardiovascular:      Rate and Rhythm: Normal rate and regular rhythm.      Pulses: Normal pulses.   Pulmonary:      Effort: Pulmonary effort is normal.      Breath sounds: Normal breath sounds.   Musculoskeletal:         General: Normal range of motion.      Cervical back: Normal range of motion.      Comments: 5/5 lower extremity strength other than bilateral knee extension 4+/5   Skin:     General: Skin is warm and dry.   Neurological:      General: No focal deficit present.      Mental Status: She is alert and oriented to person, place, and time. Mental status is at baseline.   Psychiatric:         Mood and Affect: Mood normal.         Behavior: Behavior normal.         Thought Content: Thought content normal.         Judgment: Judgment normal.         MR LUMBAR SPINE WO IV CONTRAST  Status: Final result     PACS Images     Show images for MR LUMBAR SPINE WO IV CONTRAST  Signed by    Signed Time Phone Pager   Shay Meyer 1/06/2023 16:04 [phone]      Exam Information    Status Exam Begun Exam Ended   Final 1/06/2023 13:18 1/06/2023 14:12     Study Result    Narrative & Impression   MRN: [id number]  Patient Name: JOSIE ORTEGA     STUDY:  MRI L-SPINE WO;  1/6/2023 2:12 pm     INDICATION:  Chronic lower back pain with bilateral leg tingling and right leg  weakness. Pain radiates into bilateral gluteal, left groin, and  bilateral anterior/posterior legs into the top of her foot.      COMPARISON:  10/25/2019     ACCESSION NUMBER(S):  [id number]     ORDERING CLINICIAN:  MAL CORLEY     TECHNIQUE:  Sagittal T1, T2, STIR, axial T1 and T2 weighted images of the lumbar  spine were acquired.     FINDINGS:  Transitional anatomy characterized by 6 lumbar-type vertebrae with  partial lumbarization of S1. The last well-formed disc space will be  considered S1-2 corresponding to axial series 11, image 18.     Alignment: The alignment is unremarkable.     Vertebral bodies demonstrate expected height without abnormal marrow  signal. Mild multilevel degenerative changes worst at the L5 superior  endplate. Normal disc height and T2 signal of the intervertebral  discs.     The conus terminates at L1 and appears unremarkable in appearance.     T12-L1: Only imaged in the sagittal plane. There is no posterior disc  contour abnormality. There is no spinal canal stenosis or neural  foramina narrowing.     L1-L2: There is no posterior disc contour abnormality. There is no  spinal canal stenosis or neural foraminal narrowing. There is mild  bilateral facet osteoarthropathy, worse on the right.     L2-L3: There is no posterior disc contour abnormality. There is no  spinal canal stenosis or neural foraminal narrowing. There is fluid  within the bilateral facet joints.     L3-L4: There is no posterior disc contour abnormality. There is no  spinal canal stenosis or neural foraminal narrowing. There is fluid  within the left facet joint with worse osteoarthropathy in the right  facet joint.     L4-L5: There is mild posterior disc bulge asymmetric to the right  with the extraforaminal segment contacting the right L4 nerve root.  There is no spinal canal stenosis or left neural foraminal narrowing.  There is mild bilateral facet osteoarthropathy, worse on the right.  There is fluid within the bilateral facet joint, worse on the left.     L5-S1: There is no posterior disc contour abnormality. There is no  spinal canal  stenosis or neural foraminal narrowing. There is mild  bilateral facet osteoarthropathy.     The prevertebral and posterior paraspinous soft tissues are  unremarkable.     IMPRESSION:  Transitional anatomy characterized by 6 lumbar-type vertebrae with  partial lumbarization of S1. The last well-formed disc space will be  considered S1-2 corresponding to axial series 11, image 18.     Disc bulge asymmetric to the right at L4-L5 with the extraforaminal  component contacting the exiting right L4 nerve root. Additional mild  multilevel degenerative changes as described. Findings are stable in  appearance compared to prior MRI 10/25/2019.     I personally reviewed the images/study and I agree with the findings  as stated. This study was interpreted at [place], [place].     Assessment/Plan   Diagnoses and all orders for this visit:  Lumbar back pain with radiculopathy affecting right lower extremity  -     Epidural Steroid Injection; Future  -     FL pain management; Future  -     triamcinolone acetonide (Kenalog-40) injection 40 mg  Lumbar neuritis  -     pregabalin (Lyrica) 100 mg capsule; Take 1 capsule (100 mg) by mouth 2 times a day.  -     Epidural Steroid Injection; Future  -     FL pain management; Future  -     triamcinolone acetonide (Kenalog-40) injection 40 mg  Bulging lumbar disc  Lumbar disc herniation  -     Epidural Steroid Injection; Future  -     FL pain management; Future  -     triamcinolone acetonide (Kenalog-40) injection 40 mg  Other orders  -     NPO Diet Except: Sips with meds; Effective now; Standing  -     Height and weight; Standing  -     Insert and maintain peripheral IV; Standing  -     Saline lock IV; Standing  -     POCT Glucose; Standing  -     Type And Screen; Standing  -     lactated Ringer's infusion  -     Adult diet Regular; Standing  -     Vital Signs; Standing  -     Notify physician - Standard Parameters; Standing  -     Continue IV  fluids ordered pre-procedure; Standing  -     Prior to Discharge O2 Weaning; Standing  -     Pulse oximetry, continuous; Standing  -     Discharge patient; Standing  -     iohexol (OMNIPaque) 300 mg iodine/mL solution 3 mL  -     lidocaine PF (Xylocaine) 20 mg/mL (2 %) injection 120 mg  -     sodium chloride (PF) 0.9% solution 4 mL       Patient is a 59-year-old female with the above-mentioned medical diagnoses following up today to discuss having another injection as her blood sugars have been much better under control.  She is a history of L4-5 epidural steroid injection done with triamcinolone that worked the best.  The other 1 that was done most recently was done with a different steroid and she is very insistent that the 1 done with triamcinolone worked the best.  We reviewed her MRI.  We discussed repeating the L4-5 epidural steroid injection under fluoroscopy for both diagnostic and therapeutic purposes.  Procedure was discussed.  Risk and benefits were discussed.  Patient is agreeable.  She is going to follow-up to set the injection for reevaluation.  She will call clinic in the interim should she require anything from our services.  In the meantime she will continue on Lyrica.  OARRS reviewed.  Refill sent.

## 2024-08-23 ENCOUNTER — TELEPHONE (OUTPATIENT)
Dept: PAIN MEDICINE | Facility: CLINIC | Age: 59
End: 2024-08-23
Payer: COMMERCIAL

## 2024-09-04 ENCOUNTER — LAB (OUTPATIENT)
Dept: LAB | Facility: LAB | Age: 59
End: 2024-09-04
Payer: COMMERCIAL

## 2024-09-04 DIAGNOSIS — E11.9 TYPE 2 DIABETES MELLITUS WITHOUT COMPLICATION, UNSPECIFIED WHETHER LONG TERM INSULIN USE (MULTI): ICD-10-CM

## 2024-09-04 DIAGNOSIS — E67.8 EXCESSIVE VITAMIN B6 INTAKE: ICD-10-CM

## 2024-09-04 DIAGNOSIS — E78.5 HYPERLIPIDEMIA, UNSPECIFIED HYPERLIPIDEMIA TYPE: ICD-10-CM

## 2024-09-04 DIAGNOSIS — Z13.29 SCREENING FOR THYROID DISORDER: ICD-10-CM

## 2024-09-04 DIAGNOSIS — R79.89 LOW VITAMIN B12 LEVEL: ICD-10-CM

## 2024-09-04 LAB
ALBUMIN SERPL BCP-MCNC: 4.6 G/DL (ref 3.4–5)
ALP SERPL-CCNC: 71 U/L (ref 33–110)
ALT SERPL W P-5'-P-CCNC: 32 U/L (ref 7–45)
ANION GAP SERPL CALC-SCNC: 13 MMOL/L (ref 10–20)
AST SERPL W P-5'-P-CCNC: 21 U/L (ref 9–39)
BILIRUB SERPL-MCNC: 0.7 MG/DL (ref 0–1.2)
BUN SERPL-MCNC: 16 MG/DL (ref 6–23)
CALCIUM SERPL-MCNC: 9.7 MG/DL (ref 8.6–10.3)
CHLORIDE SERPL-SCNC: 104 MMOL/L (ref 98–107)
CHOLEST SERPL-MCNC: 154 MG/DL (ref 0–199)
CHOLESTEROL/HDL RATIO: 3.4
CO2 SERPL-SCNC: 26 MMOL/L (ref 21–32)
CREAT SERPL-MCNC: 0.86 MG/DL (ref 0.5–1.05)
EGFRCR SERPLBLD CKD-EPI 2021: 78 ML/MIN/1.73M*2
ERYTHROCYTE [DISTWIDTH] IN BLOOD BY AUTOMATED COUNT: 12.6 % (ref 11.5–14.5)
EST. AVERAGE GLUCOSE BLD GHB EST-MCNC: 169 MG/DL
GLUCOSE SERPL-MCNC: 161 MG/DL (ref 74–99)
HBA1C MFR BLD: 7.5 %
HCT VFR BLD AUTO: 46.2 % (ref 36–46)
HDLC SERPL-MCNC: 45 MG/DL
HGB BLD-MCNC: 14.5 G/DL (ref 12–16)
LDLC SERPL CALC-MCNC: 77 MG/DL
MCH RBC QN AUTO: 28.1 PG (ref 26–34)
MCHC RBC AUTO-ENTMCNC: 31.4 G/DL (ref 32–36)
MCV RBC AUTO: 90 FL (ref 80–100)
NON HDL CHOLESTEROL: 109 MG/DL (ref 0–149)
NRBC BLD-RTO: 0 /100 WBCS (ref 0–0)
PLATELET # BLD AUTO: 288 X10*3/UL (ref 150–450)
POTASSIUM SERPL-SCNC: 4 MMOL/L (ref 3.5–5.3)
PROT SERPL-MCNC: 6.6 G/DL (ref 6.4–8.2)
RBC # BLD AUTO: 5.16 X10*6/UL (ref 4–5.2)
SODIUM SERPL-SCNC: 139 MMOL/L (ref 136–145)
TRIGL SERPL-MCNC: 159 MG/DL (ref 0–149)
TSH SERPL-ACNC: 1.94 MIU/L (ref 0.44–3.98)
VIT B12 SERPL-MCNC: 207 PG/ML (ref 211–911)
VLDL: 32 MG/DL (ref 0–40)
WBC # BLD AUTO: 7.7 X10*3/UL (ref 4.4–11.3)

## 2024-09-04 PROCEDURE — 82607 VITAMIN B-12: CPT

## 2024-09-04 PROCEDURE — 83036 HEMOGLOBIN GLYCOSYLATED A1C: CPT

## 2024-09-04 PROCEDURE — 80061 LIPID PANEL: CPT

## 2024-09-04 PROCEDURE — 85027 COMPLETE CBC AUTOMATED: CPT

## 2024-09-04 PROCEDURE — 84207 ASSAY OF VITAMIN B-6: CPT

## 2024-09-04 PROCEDURE — 84443 ASSAY THYROID STIM HORMONE: CPT

## 2024-09-04 PROCEDURE — 80053 COMPREHEN METABOLIC PANEL: CPT

## 2024-09-04 PROCEDURE — 36415 COLL VENOUS BLD VENIPUNCTURE: CPT

## 2024-09-06 ENCOUNTER — HOSPITAL ENCOUNTER (OUTPATIENT)
Dept: OPERATING ROOM | Facility: HOSPITAL | Age: 59
Setting detail: OUTPATIENT SURGERY
Discharge: HOME | End: 2024-09-06
Payer: COMMERCIAL

## 2024-09-06 VITALS
SYSTOLIC BLOOD PRESSURE: 142 MMHG | TEMPERATURE: 96.6 F | HEART RATE: 70 BPM | OXYGEN SATURATION: 99 % | WEIGHT: 169 LBS | RESPIRATION RATE: 16 BRPM | DIASTOLIC BLOOD PRESSURE: 79 MMHG | HEIGHT: 63 IN | BODY MASS INDEX: 29.95 KG/M2

## 2024-09-06 DIAGNOSIS — M51.26 LUMBAR DISC HERNIATION: ICD-10-CM

## 2024-09-06 DIAGNOSIS — M54.16 LUMBAR BACK PAIN WITH RADICULOPATHY AFFECTING RIGHT LOWER EXTREMITY: ICD-10-CM

## 2024-09-06 DIAGNOSIS — M54.16 LUMBAR NEURITIS: ICD-10-CM

## 2024-09-06 LAB — GLUCOSE BLD MANUAL STRIP-MCNC: 143 MG/DL (ref 74–99)

## 2024-09-06 PROCEDURE — 2550000001 HC RX 255 CONTRASTS: Performed by: PHYSICIAN ASSISTANT

## 2024-09-06 PROCEDURE — 2500000005 HC RX 250 GENERAL PHARMACY W/O HCPCS: Performed by: STUDENT IN AN ORGANIZED HEALTH CARE EDUCATION/TRAINING PROGRAM

## 2024-09-06 PROCEDURE — 2500000004 HC RX 250 GENERAL PHARMACY W/ HCPCS (ALT 636 FOR OP/ED): Performed by: STUDENT IN AN ORGANIZED HEALTH CARE EDUCATION/TRAINING PROGRAM

## 2024-09-06 PROCEDURE — 82947 ASSAY GLUCOSE BLOOD QUANT: CPT

## 2024-09-06 PROCEDURE — 62323 NJX INTERLAMINAR LMBR/SAC: CPT | Performed by: STUDENT IN AN ORGANIZED HEALTH CARE EDUCATION/TRAINING PROGRAM

## 2024-09-06 PROCEDURE — 2500000005 HC RX 250 GENERAL PHARMACY W/O HCPCS: Performed by: PHYSICIAN ASSISTANT

## 2024-09-06 RX ORDER — SODIUM CHLORIDE 9 MG/ML
4 INJECTION, SOLUTION INTRAMUSCULAR; INTRAVENOUS; SUBCUTANEOUS ONCE
Status: DISCONTINUED | OUTPATIENT
Start: 2024-09-06 | End: 2024-09-07 | Stop reason: HOSPADM

## 2024-09-06 RX ORDER — LIDOCAINE HYDROCHLORIDE 20 MG/ML
6 INJECTION, SOLUTION EPIDURAL; INFILTRATION; INTRACAUDAL; PERINEURAL ONCE
Status: COMPLETED | OUTPATIENT
Start: 2024-09-06 | End: 2024-09-06

## 2024-09-06 RX ORDER — METHYLPREDNISOLONE ACETATE 40 MG/ML
INJECTION, SUSPENSION INTRA-ARTICULAR; INTRALESIONAL; INTRAMUSCULAR; SOFT TISSUE AS NEEDED
Status: COMPLETED | OUTPATIENT
Start: 2024-09-06 | End: 2024-09-06

## 2024-09-06 RX ORDER — TRIAMCINOLONE ACETONIDE 40 MG/ML
40 INJECTION, SUSPENSION INTRA-ARTICULAR; INTRAMUSCULAR ONCE
Status: ACTIVE | OUTPATIENT
Start: 2024-09-06

## 2024-09-06 RX ORDER — SODIUM CHLORIDE 9 MG/ML
INJECTION, SOLUTION INTRAMUSCULAR; INTRAVENOUS; SUBCUTANEOUS AS NEEDED
Status: COMPLETED | OUTPATIENT
Start: 2024-09-06 | End: 2024-09-06

## 2024-09-06 RX ORDER — SODIUM CHLORIDE, SODIUM LACTATE, POTASSIUM CHLORIDE, CALCIUM CHLORIDE 600; 310; 30; 20 MG/100ML; MG/100ML; MG/100ML; MG/100ML
20 INJECTION, SOLUTION INTRAVENOUS CONTINUOUS
Status: DISCONTINUED | OUTPATIENT
Start: 2024-09-06 | End: 2024-09-07 | Stop reason: HOSPADM

## 2024-09-06 ASSESSMENT — ENCOUNTER SYMPTOMS
LOSS OF SENSATION IN FEET: 1
DEPRESSION: 0
OCCASIONAL FEELINGS OF UNSTEADINESS: 1

## 2024-09-06 ASSESSMENT — PAIN SCALES - GENERAL
PAINLEVEL_OUTOF10: 5 - MODERATE PAIN
PAINLEVEL_OUTOF10: 0 - NO PAIN

## 2024-09-06 ASSESSMENT — PAIN - FUNCTIONAL ASSESSMENT
PAIN_FUNCTIONAL_ASSESSMENT: 0-10
PAIN_FUNCTIONAL_ASSESSMENT: 0-10

## 2024-09-06 NOTE — PERIOPERATIVE NURSING NOTE
Discharge instructions reviewed by  Sondra SAVAGE RN  no questions and verbalized understanding.   discharged amb to exit steady gait,  to be driven home by family yamile well

## 2024-09-06 NOTE — Clinical Note
Pt arrived to OR on cart. Transferred to OR table. Safety strap applied. Pt prepped with Chloraprep via Dr Burrows. Bandaids applied to injection sites. Pt. Transferred to ACS on cart.

## 2024-09-08 LAB — PYRIDOXAL PHOS SERPL-SCNC: 51 NMOL/L (ref 20–125)

## 2024-09-12 ENCOUNTER — APPOINTMENT (OUTPATIENT)
Dept: PRIMARY CARE | Facility: CLINIC | Age: 59
End: 2024-09-12
Payer: COMMERCIAL

## 2024-09-12 VITALS
WEIGHT: 166 LBS | OXYGEN SATURATION: 97 % | BODY MASS INDEX: 29.41 KG/M2 | SYSTOLIC BLOOD PRESSURE: 138 MMHG | HEIGHT: 63 IN | HEART RATE: 62 BPM | DIASTOLIC BLOOD PRESSURE: 80 MMHG

## 2024-09-12 DIAGNOSIS — E11.9 TYPE 2 DIABETES MELLITUS WITHOUT COMPLICATION, UNSPECIFIED WHETHER LONG TERM INSULIN USE (MULTI): ICD-10-CM

## 2024-09-12 DIAGNOSIS — K21.9 GASTROESOPHAGEAL REFLUX DISEASE WITHOUT ESOPHAGITIS: ICD-10-CM

## 2024-09-12 DIAGNOSIS — F41.9 ANXIETY: ICD-10-CM

## 2024-09-12 DIAGNOSIS — R60.0 LOCALIZED EDEMA: ICD-10-CM

## 2024-09-12 DIAGNOSIS — G25.81 RESTLESS LEG SYNDROME: ICD-10-CM

## 2024-09-12 DIAGNOSIS — M54.16 LUMBAR NEURITIS: ICD-10-CM

## 2024-09-12 DIAGNOSIS — E78.2 MIXED HYPERLIPIDEMIA: Primary | ICD-10-CM

## 2024-09-12 PROCEDURE — 1036F TOBACCO NON-USER: CPT

## 2024-09-12 PROCEDURE — 3075F SYST BP GE 130 - 139MM HG: CPT

## 2024-09-12 PROCEDURE — 3079F DIAST BP 80-89 MM HG: CPT

## 2024-09-12 PROCEDURE — 3048F LDL-C <100 MG/DL: CPT

## 2024-09-12 PROCEDURE — 3008F BODY MASS INDEX DOCD: CPT

## 2024-09-12 PROCEDURE — 99214 OFFICE O/P EST MOD 30 MIN: CPT

## 2024-09-12 PROCEDURE — 3051F HG A1C>EQUAL 7.0%<8.0%: CPT

## 2024-09-12 RX ORDER — LANCETS 33 GAUGE
1 EACH MISCELLANEOUS 2 TIMES DAILY
Qty: 100 EACH | Refills: 1 | Status: SHIPPED | OUTPATIENT
Start: 2024-09-12 | End: 2025-09-12

## 2024-09-12 RX ORDER — ROPINIROLE 2 MG/1
2 TABLET, FILM COATED ORAL NIGHTLY
Qty: 90 TABLET | Refills: 3 | Status: SHIPPED | OUTPATIENT
Start: 2024-09-12 | End: 2025-09-12

## 2024-09-12 ASSESSMENT — ENCOUNTER SYMPTOMS
CONSTITUTIONAL NEGATIVE: 1
CARDIOVASCULAR NEGATIVE: 1
GASTROINTESTINAL NEGATIVE: 1
RESPIRATORY NEGATIVE: 1

## 2024-09-12 NOTE — PROGRESS NOTES
"Subjective   Patient ID: Hallie Demarco is a 59 y.o. female who presents for pt here for 3 month med check .    HPI   DM2: A1C 7.5% last check. Compliant with metformin, no SE. Blood sugar at home has been averaging close to 160-170s. Trial of Trulicity and Ozempic and SE of significant nausea. Eye doctor annually.  GERD: Stable on pantoprazole, no SE.  HYPERLIPIDEMIA: Lipids WNL last check. Compliant with statin, no SE.  BL HIP/BACK PAIN/BL LE NEUROPATHY: Fall on ice 2018, still w/residual pain/symptoms. Following with pain mgmt, receives injections. Has seen neurosurgeon Dr. Wright and does not think that neuropathy symptoms are from spine, EMG unremarkable June 2023. Dr. Wright thinks possible restless leg. Endorses nightly leg spasms and tingling with some mild burning BL LE, getting into hot bath helps, Requip 2mg has been helpful, does not completely resolve symptoms. Lyrica only somewhat effective. Following with pain mgmt, injections are somewhat helpful.  ANXIETY/DEPRESSION: Compliant w/sertraline, no SE, stable.  EDEMA: BL LE edema, chronic, compression stockings prn.    BP always elevated at doctor's office, good at home.    Hx of tx by Dr. Faria in [place] ([facility]) for non-RA factor positive RA. Then was told by another rheumatologist in [place] which said she did not have RA.      Dermatology, sees Dejan at [facility].  Hx of cystocele, s/p hysterectomy. Women's health with [facility].  Hx of vocal cord cancer, follows with riaz Azevedo. No further follow up.  Mammogram good 1/2024, due again 1/2025.  Colonoscopy good 2015, due again 2025.    Review of Systems   Constitutional: Negative.    Respiratory: Negative.     Cardiovascular: Negative.    Gastrointestinal: Negative.        Objective   /80   Pulse 62   Ht 1.6 m (5' 3\")   Wt 75.3 kg (166 lb)   SpO2 97%   BMI 29.41 kg/m²     Physical Exam  Constitutional:       General: She is not in acute distress.     Appearance: " Normal appearance. She is not ill-appearing.   HENT:      Head: Normocephalic and atraumatic.   Eyes:      Extraocular Movements: Extraocular movements intact.      Conjunctiva/sclera: Conjunctivae normal.   Cardiovascular:      Rate and Rhythm: Normal rate.   Pulmonary:      Effort: Pulmonary effort is normal.   Abdominal:      General: There is no distension.   Musculoskeletal:         General: Normal range of motion.      Cervical back: Normal range of motion.   Skin:     General: Skin is warm and dry.   Neurological:      General: No focal deficit present.      Mental Status: She is alert and oriented to person, place, and time.   Psychiatric:         Mood and Affect: Mood normal.         Behavior: Behavior normal.         Thought Content: Thought content normal.         Judgment: Judgment normal.         Assessment/Plan        Increase Jardiance to 25mg daily.  No other medication changes today.  Labs reviewed, A1C still elevated, rest of labs stable.  Follow up 3 months with A1C prior.

## 2024-10-03 ENCOUNTER — OFFICE VISIT (OUTPATIENT)
Dept: PAIN MEDICINE | Facility: CLINIC | Age: 59
End: 2024-10-03
Payer: COMMERCIAL

## 2024-10-03 VITALS — SYSTOLIC BLOOD PRESSURE: 136 MMHG | DIASTOLIC BLOOD PRESSURE: 80 MMHG | HEART RATE: 73 BPM

## 2024-10-03 DIAGNOSIS — M51.369 BULGING LUMBAR DISC: ICD-10-CM

## 2024-10-03 DIAGNOSIS — M54.16 LUMBAR NEURITIS: ICD-10-CM

## 2024-10-03 DIAGNOSIS — R20.2 NUMBNESS AND TINGLING: ICD-10-CM

## 2024-10-03 DIAGNOSIS — G89.29 CHRONIC BILATERAL LOW BACK PAIN WITH BILATERAL SCIATICA: Primary | ICD-10-CM

## 2024-10-03 DIAGNOSIS — R20.0 NUMBNESS AND TINGLING: ICD-10-CM

## 2024-10-03 DIAGNOSIS — M54.41 CHRONIC BILATERAL LOW BACK PAIN WITH BILATERAL SCIATICA: Primary | ICD-10-CM

## 2024-10-03 DIAGNOSIS — G89.4 CHRONIC PAIN DISORDER: ICD-10-CM

## 2024-10-03 DIAGNOSIS — M54.42 CHRONIC BILATERAL LOW BACK PAIN WITH BILATERAL SCIATICA: Primary | ICD-10-CM

## 2024-10-03 DIAGNOSIS — M51.26 LUMBAR DISC HERNIATION: ICD-10-CM

## 2024-10-03 PROCEDURE — 99213 OFFICE O/P EST LOW 20 MIN: CPT | Performed by: PHYSICIAN ASSISTANT

## 2024-10-03 ASSESSMENT — ENCOUNTER SYMPTOMS
EYES NEGATIVE: 1
WEAKNESS: 1
RESPIRATORY NEGATIVE: 1
PSYCHIATRIC NEGATIVE: 1
ARTHRALGIAS: 1
GASTROINTESTINAL NEGATIVE: 1
CONSTITUTIONAL NEGATIVE: 1
ENDOCRINE NEGATIVE: 1
CARDIOVASCULAR NEGATIVE: 1
ALLERGIC/IMMUNOLOGIC NEGATIVE: 1
HEMATOLOGIC/LYMPHATIC NEGATIVE: 1
NUMBNESS: 1
MYALGIAS: 1
BACK PAIN: 1

## 2024-10-03 ASSESSMENT — COLUMBIA-SUICIDE SEVERITY RATING SCALE - C-SSRS
1. IN THE PAST MONTH, HAVE YOU WISHED YOU WERE DEAD OR WISHED YOU COULD GO TO SLEEP AND NOT WAKE UP?: NO
6. HAVE YOU EVER DONE ANYTHING, STARTED TO DO ANYTHING, OR PREPARED TO DO ANYTHING TO END YOUR LIFE?: NO
2. HAVE YOU ACTUALLY HAD ANY THOUGHTS OF KILLING YOURSELF?: NO

## 2024-10-03 NOTE — PROGRESS NOTES
Subjective   Patient ID: Hallie Demarco is a 59 y.o. female who presents for Follow-up (FOLLOW UP L4-5 KARSTEN DONE ON 9/6/24, SHE STATE THE ACHING PAIN IS BETTER WITH GETTING OUT OF BED OR STANDING, SHE STILL HAS ACHING TO HER LOWER BACK WITH STANDING,SITTING RIDING IN CAR IS UNCOMFORTABLE ,WALKING,TRANSITIONING SLOW,BENDING, SHE STILL HAS THE CLAWING PAIN IN HER LEGS AND FEET). SHE USES ICE/HEAT, WARM WATER FOR RELIEF OF LEGS, TAKING LYRICA DAILY, TYLENOL, PAIN SCORE 4/10, ZULY=52%    Lucia Warner, CMA 10/03/24 11:09 AM     Patient is a 59-year-old female.  She presents today for follow-up after undergoing an L5-S1 epidural steroid injection.  This was done with triamcinolone as patient feels that this works best for her.  She states that gave her 50% relief.  She has a long history of these injections and has undergone them with triamcinolone and dexamethasone and the triamcinolone works the best.  At this time, she does still have some back and leg pain but she states that overall, things are better and at this time, she does not want to do anything differently.  She feels that things are improved.  She rates her pain a 4/10.  She does Lyrica 100 mg twice daily.  She feels that between the medication and the injection things are much better tolerable.          Review of Systems   Constitutional: Negative.    HENT: Negative.     Eyes: Negative.    Respiratory: Negative.     Cardiovascular: Negative.    Gastrointestinal: Negative.    Endocrine: Negative.    Genitourinary: Negative.    Musculoskeletal:  Positive for arthralgias, back pain, gait problem and myalgias.   Skin: Negative.    Allergic/Immunologic: Negative.    Neurological:  Positive for weakness and numbness.   Hematological: Negative.    Psychiatric/Behavioral: Negative.         Objective   Physical Exam  Vitals and nursing note reviewed.   Constitutional:       General: She is not in acute distress.     Appearance: Normal appearance. She is not  ill-appearing.   HENT:      Head: Normocephalic and atraumatic.      Right Ear: External ear normal.      Left Ear: External ear normal.      Nose: Nose normal.      Mouth/Throat:      Pharynx: Oropharynx is clear.   Eyes:      Conjunctiva/sclera: Conjunctivae normal.   Cardiovascular:      Rate and Rhythm: Normal rate and regular rhythm.      Pulses: Normal pulses.   Pulmonary:      Effort: Pulmonary effort is normal.      Breath sounds: Normal breath sounds.   Musculoskeletal:         General: Normal range of motion.      Cervical back: Normal range of motion.      Comments: 5\5 strength   Skin:     General: Skin is warm and dry.   Neurological:      General: No focal deficit present.      Mental Status: She is alert and oriented to person, place, and time. Mental status is at baseline.   Psychiatric:         Mood and Affect: Mood normal.         Behavior: Behavior normal.         Thought Content: Thought content normal.         Judgment: Judgment normal.         Assessment/Plan   Diagnoses and all orders for this visit:  Chronic bilateral low back pain with bilateral sciatica  Bulging lumbar disc  Chronic pain disorder  Lumbar disc herniation  Lumbar neuritis  Numbness and tingling       Patient is a 59-year-old female following up today after undergoing an L5-S1 epidural steroid injection done under  fluoroscopy with triamcinolone.  She feels that the triamcinolone works better for her.  She is feeling well.  She is comfort.  She is happy.  She states that things are better improved and between the injection and her Lyrica she feels that her pain is overall controlled.  She does not want to do anything differently.  She does not want to have another injection.  She is wants to continue to monitor herself.  She is going to follow-up in 2 months for reevaluation and discussion of repeat injection should it be necessary.

## 2024-10-08 ENCOUNTER — APPOINTMENT (OUTPATIENT)
Dept: PAIN MEDICINE | Facility: CLINIC | Age: 59
End: 2024-10-08
Payer: COMMERCIAL

## 2024-12-03 ENCOUNTER — APPOINTMENT (OUTPATIENT)
Dept: PAIN MEDICINE | Facility: CLINIC | Age: 59
End: 2024-12-03
Payer: COMMERCIAL

## 2024-12-10 ENCOUNTER — LAB (OUTPATIENT)
Dept: LAB | Facility: LAB | Age: 59
End: 2024-12-10
Payer: COMMERCIAL

## 2024-12-10 ENCOUNTER — OFFICE VISIT (OUTPATIENT)
Dept: PAIN MEDICINE | Facility: CLINIC | Age: 59
End: 2024-12-10
Payer: COMMERCIAL

## 2024-12-10 VITALS
SYSTOLIC BLOOD PRESSURE: 148 MMHG | BODY MASS INDEX: 31 KG/M2 | WEIGHT: 175 LBS | RESPIRATION RATE: 16 BRPM | HEART RATE: 68 BPM | DIASTOLIC BLOOD PRESSURE: 84 MMHG

## 2024-12-10 DIAGNOSIS — E11.9 TYPE 2 DIABETES MELLITUS WITHOUT COMPLICATION, UNSPECIFIED WHETHER LONG TERM INSULIN USE (MULTI): ICD-10-CM

## 2024-12-10 DIAGNOSIS — M54.16 LUMBAR NEURITIS: Primary | ICD-10-CM

## 2024-12-10 DIAGNOSIS — M51.26 LUMBAR DISC HERNIATION: ICD-10-CM

## 2024-12-10 LAB
EST. AVERAGE GLUCOSE BLD GHB EST-MCNC: 192 MG/DL
HBA1C MFR BLD: 8.3 %

## 2024-12-10 PROCEDURE — 99213 OFFICE O/P EST LOW 20 MIN: CPT

## 2024-12-10 PROCEDURE — 36415 COLL VENOUS BLD VENIPUNCTURE: CPT

## 2024-12-10 PROCEDURE — 83036 HEMOGLOBIN GLYCOSYLATED A1C: CPT

## 2024-12-10 RX ORDER — PREGABALIN 100 MG/1
100 CAPSULE ORAL 2 TIMES DAILY
Qty: 60 CAPSULE | Refills: 5 | Status: SHIPPED | OUTPATIENT
Start: 2024-12-10

## 2024-12-10 ASSESSMENT — ENCOUNTER SYMPTOMS
BACK PAIN: 1
WHEEZING: 0
MYALGIAS: 1
ALLERGIC/IMMUNOLOGIC NEGATIVE: 1
CONSTITUTIONAL NEGATIVE: 1
LIGHT-HEADEDNESS: 0
DYSPHORIC MOOD: 0
PALPITATIONS: 0
COUGH: 0
ENDOCRINE NEGATIVE: 1
ARTHRALGIAS: 0
ADENOPATHY: 0
SHORTNESS OF BREATH: 0
BRUISES/BLEEDS EASILY: 0
WEAKNESS: 0
FACIAL ASYMMETRY: 0
EYES NEGATIVE: 1

## 2024-12-10 NOTE — PROGRESS NOTES
Subjective   Patient ID: Hallie Demarco is a 59 y.o. female who presents for Pain (FUV for favio low back pain with radiation to favio legs to and including both feet. Pain score 3/10 today. Pain is described as an dull ache with occasional sharp. Pain is constant just different levels. Sitting or standing can cause or increase pain. Pain patches or a warm tub soak will help relieve some pain. Patient would like to hold off on scheduling next injection d/t blood glucase being high.). Refill needed for Pregabalin. ZULY = 58/100.  Becky Guo RN 12/10/24 10:58 AM     Patient is a 59-year-old female presents today for a follow-up appointment for medication management.  She currently rates her pain a 3/10, says she has good days and bad days and the pain will vary also depending on her activity level.  Pain is across her lower back and will radiate into bilateral legs to her feet.  Sitting or standing will cause increased pain.  She says she is not interested in further injections at this time, as she is trying to get her blood sugar down to a more manageable level before looking into further injections.  At her most recent appointment she was continued on pregabalin 100 mg twice a day.  She denies side effects to this medication, although the  notes occasionally she will have a brief episode of brain fog about an hour after taking her medication, but says this does not happen all the time.  For this reason, we will not increase the dose.  She is requesting a refill of this medication today, and would like to just maintain on medication management at this time.        Review of Systems   Constitutional: Negative.    HENT: Negative.     Eyes: Negative.    Respiratory:  Negative for cough, shortness of breath and wheezing.    Cardiovascular:  Negative for chest pain, palpitations and leg swelling.   Endocrine: Negative.    Genitourinary: Negative.    Musculoskeletal:  Positive for back pain and myalgias. Negative  for arthralgias.   Skin: Negative.    Allergic/Immunologic: Negative.    Neurological:  Negative for facial asymmetry, weakness and light-headedness.   Hematological:  Negative for adenopathy. Does not bruise/bleed easily.   Psychiatric/Behavioral:  Negative for dysphoric mood and suicidal ideas.        Objective   Physical Exam  Constitutional:       General: She is not in acute distress.     Appearance: Normal appearance.   HENT:      Head: Normocephalic.      Mouth/Throat:      Mouth: Mucous membranes are moist.   Eyes:      Extraocular Movements: Extraocular movements intact.   Cardiovascular:      Rate and Rhythm: Normal rate and regular rhythm.      Pulses: Normal pulses.      Heart sounds: Normal heart sounds. No murmur heard.     No friction rub. No gallop.   Pulmonary:      Effort: Pulmonary effort is normal.      Breath sounds: Normal breath sounds. No wheezing, rhonchi or rales.   Abdominal:      General: Abdomen is flat.      Palpations: Abdomen is soft.   Musculoskeletal:      Cervical back: Normal range of motion.      Right lower leg: No edema.      Left lower leg: No edema.      Comments: Ambulates without assistance  Strength 5/5 BLE   Lymphadenopathy:      Cervical: No cervical adenopathy.   Skin:     General: Skin is warm and dry.   Neurological:      General: No focal deficit present.      Mental Status: She is alert and oriented to person, place, and time. Mental status is at baseline.   Psychiatric:         Mood and Affect: Mood normal.         Behavior: Behavior normal.         Assessment/Plan   Diagnoses and all orders for this visit:  Lumbar neuritis  -     pregabalin (Lyrica) 100 mg capsule; Take 1 capsule (100 mg) by mouth 2 times a day.  Lumbar disc herniation       The patient is a 59-year-old female with a past medical history significant for the above-mentioned problems.  Given that she would like to maintain with medication management at this time and is not interested in further  injections she is requesting refill of her pregabalin.  PDMP reviewed, refill sent.  She would like to follow-up in 6 months, but she will call the clinic sooner if needed, and and will call if she would like to pursue any interventions before that time.

## 2024-12-16 ENCOUNTER — APPOINTMENT (OUTPATIENT)
Dept: PRIMARY CARE | Facility: CLINIC | Age: 59
End: 2024-12-16
Payer: COMMERCIAL

## 2024-12-16 VITALS
BODY MASS INDEX: 31.01 KG/M2 | HEIGHT: 63 IN | SYSTOLIC BLOOD PRESSURE: 138 MMHG | DIASTOLIC BLOOD PRESSURE: 78 MMHG | OXYGEN SATURATION: 98 % | HEART RATE: 76 BPM | WEIGHT: 175 LBS

## 2024-12-16 DIAGNOSIS — K21.9 GASTROESOPHAGEAL REFLUX DISEASE WITHOUT ESOPHAGITIS: ICD-10-CM

## 2024-12-16 DIAGNOSIS — E11.9 TYPE 2 DIABETES MELLITUS WITHOUT COMPLICATION, UNSPECIFIED WHETHER LONG TERM INSULIN USE (MULTI): ICD-10-CM

## 2024-12-16 DIAGNOSIS — R79.89 LOW VITAMIN B12 LEVEL: ICD-10-CM

## 2024-12-16 DIAGNOSIS — F41.9 ANXIETY: ICD-10-CM

## 2024-12-16 DIAGNOSIS — Z13.29 SCREENING FOR THYROID DISORDER: ICD-10-CM

## 2024-12-16 DIAGNOSIS — E78.2 MIXED HYPERLIPIDEMIA: Primary | ICD-10-CM

## 2024-12-16 DIAGNOSIS — E78.5 HYPERLIPIDEMIA, UNSPECIFIED HYPERLIPIDEMIA TYPE: ICD-10-CM

## 2024-12-16 DIAGNOSIS — E67.8 EXCESSIVE VITAMIN B6 INTAKE: ICD-10-CM

## 2024-12-16 PROCEDURE — 3075F SYST BP GE 130 - 139MM HG: CPT

## 2024-12-16 PROCEDURE — 1036F TOBACCO NON-USER: CPT

## 2024-12-16 PROCEDURE — 3048F LDL-C <100 MG/DL: CPT

## 2024-12-16 PROCEDURE — 3078F DIAST BP <80 MM HG: CPT

## 2024-12-16 PROCEDURE — 99214 OFFICE O/P EST MOD 30 MIN: CPT

## 2024-12-16 PROCEDURE — 3052F HG A1C>EQUAL 8.0%<EQUAL 9.0%: CPT

## 2024-12-16 PROCEDURE — 3008F BODY MASS INDEX DOCD: CPT

## 2024-12-16 RX ORDER — PANTOPRAZOLE SODIUM 40 MG/1
40 TABLET, DELAYED RELEASE ORAL DAILY
Qty: 90 TABLET | Refills: 3 | Status: SHIPPED | OUTPATIENT
Start: 2024-12-16 | End: 2025-12-16

## 2024-12-16 RX ORDER — ROSUVASTATIN CALCIUM 5 MG/1
5 TABLET, COATED ORAL DAILY
Qty: 90 TABLET | Refills: 3 | Status: SHIPPED | OUTPATIENT
Start: 2024-12-16 | End: 2025-12-16

## 2024-12-16 RX ORDER — SERTRALINE HYDROCHLORIDE 25 MG/1
25 TABLET, FILM COATED ORAL DAILY
Qty: 90 TABLET | Refills: 3 | Status: SHIPPED | OUTPATIENT
Start: 2024-12-16 | End: 2025-12-16

## 2024-12-16 RX ORDER — METFORMIN HYDROCHLORIDE 500 MG/1
1000 TABLET, EXTENDED RELEASE ORAL DAILY
Qty: 180 TABLET | Refills: 3 | Status: SHIPPED | OUTPATIENT
Start: 2024-12-16 | End: 2025-12-16

## 2024-12-16 ASSESSMENT — ENCOUNTER SYMPTOMS
RESPIRATORY NEGATIVE: 1
CONSTITUTIONAL NEGATIVE: 1
GASTROINTESTINAL NEGATIVE: 1
CARDIOVASCULAR NEGATIVE: 1

## 2024-12-16 NOTE — PROGRESS NOTES
"Subjective   Patient ID: Hallie Demarco is a 59 y.o. female who presents for pt here 3 month med check .    HPI   DM2: A1C 8.3% last check. Compliant with metformin and Jardiance, no SE. Blood sugar at home has been averaging close to 190s-200s. Trial of Trulicity and Ozempic and SE of significant nausea. Eye doctor annually.  GERD: Stable on pantoprazole, no SE.  HYPERLIPIDEMIA: Lipids WNL last check. Compliant with statin, no SE.  BL HIP/BACK PAIN/BL LE NEUROPATHY: Fall on ice 2018, still w/residual pain/symptoms. Following with pain mgmt, receives injections. Has seen neurosurgeon Dr. Wright and does not think that neuropathy symptoms are from spine, EMG unremarkable June 2023. Dr. Wright thinks possible restless leg. Endorses nightly leg spasms and tingling with some mild burning BL LE, getting into hot bath helps, Requip 2mg has been helpful, does not completely resolve symptoms. Lyrica only somewhat effective. Following with pain mgmt, injections are somewhat helpful.  ANXIETY/DEPRESSION: Compliant w/sertraline, no SE, stable.  EDEMA: BL LE edema, chronic, compression stockings prn.     BP always elevated at doctor's office, good at home.     Hx of tx by Dr. Faria in [place] ([facility]) for non-RA factor positive RA. Then was told by another rheumatologist in [place] which said she did not have RA.      Dermatology, sees Dejan at [facility].  Hx of cystocele, s/p hysterectomy. [facility]'s health with [facility].  Hx of vocal cord cancer, follows with riaz Azevedo. No further follow up.  Mammogram good 1/2024, due again 1/2025.  Colonoscopy good 2015, due again 2025.    Review of Systems   Constitutional: Negative.    Respiratory: Negative.     Cardiovascular: Negative.    Gastrointestinal: Negative.        Objective   /78   Pulse 76   Ht 1.6 m (5' 3\")   Wt 79.4 kg (175 lb)   SpO2 98%   BMI 31.00 kg/m²     Physical Exam  Constitutional:       General: She is not in acute distress.     " Appearance: Normal appearance. She is not ill-appearing.   HENT:      Head: Normocephalic and atraumatic.   Eyes:      Extraocular Movements: Extraocular movements intact.      Conjunctiva/sclera: Conjunctivae normal.   Cardiovascular:      Rate and Rhythm: Normal rate.   Pulmonary:      Effort: Pulmonary effort is normal.   Abdominal:      General: There is no distension.   Musculoskeletal:         General: Normal range of motion.      Cervical back: Normal range of motion.   Skin:     General: Skin is warm and dry.   Neurological:      General: No focal deficit present.      Mental Status: She is alert and oriented to person, place, and time.   Psychiatric:         Mood and Affect: Mood normal.         Behavior: Behavior normal.         Thought Content: Thought content normal.         Judgment: Judgment normal.         Assessment/Plan        A1C increased to 8.3, has been eating a lot of sweets lately. Would not like to adjust medication at this time, will make lifestyle changes.  Other chronic conditions seem stable.  No medication changes today.  Follow up 3 months with fasting labs prior.

## 2024-12-20 ENCOUNTER — TELEPHONE (OUTPATIENT)
Dept: PRIMARY CARE | Facility: CLINIC | Age: 59
End: 2024-12-20
Payer: COMMERCIAL

## 2024-12-20 DIAGNOSIS — E11.9 TYPE 2 DIABETES MELLITUS WITHOUT COMPLICATION, UNSPECIFIED WHETHER LONG TERM INSULIN USE (MULTI): ICD-10-CM

## 2024-12-20 NOTE — TELEPHONE ENCOUNTER
Pt was here on Monday and forgot to tell you that the increased Jardiance 25 mg, makes her sick to her stomach    please advise

## 2025-03-05 ENCOUNTER — TELEPHONE (OUTPATIENT)
Dept: PRIMARY CARE | Facility: CLINIC | Age: 60
End: 2025-03-05
Payer: COMMERCIAL

## 2025-03-05 DIAGNOSIS — J10.1 INFLUENZA A: Primary | ICD-10-CM

## 2025-03-05 RX ORDER — OSELTAMIVIR PHOSPHATE 75 MG/1
75 CAPSULE ORAL 2 TIMES DAILY
Qty: 10 CAPSULE | Refills: 0 | Status: SHIPPED | OUTPATIENT
Start: 2025-03-05 | End: 2025-03-10

## 2025-03-05 NOTE — TELEPHONE ENCOUNTER
SHE TOOK A FLU TEST AND IT IS +. HER SYSTEMS IS HEADACHE, COUGH, SORE THROAT AND BODY ACHES. SHE WOULD LIKE TO KNOW IF SHE CAN HAVE ANYTHING FOR THIS. [facility]. SHE WOULD LIKE A CALL WHEN THIS IS SEND IN. THANK YOU.

## 2025-03-17 DIAGNOSIS — E11.9 TYPE 2 DIABETES MELLITUS WITHOUT COMPLICATION, UNSPECIFIED WHETHER LONG TERM INSULIN USE (MULTI): ICD-10-CM

## 2025-03-18 ENCOUNTER — TELEPHONE (OUTPATIENT)
Dept: PRIMARY CARE | Facility: CLINIC | Age: 60
End: 2025-03-18
Payer: MEDICARE

## 2025-03-18 DIAGNOSIS — J01.40 ACUTE NON-RECURRENT PANSINUSITIS: Primary | ICD-10-CM

## 2025-03-18 RX ORDER — AMOXICILLIN AND CLAVULANATE POTASSIUM 875; 125 MG/1; MG/1
875 TABLET, FILM COATED ORAL 2 TIMES DAILY
Qty: 10 TABLET | Refills: 0 | Status: SHIPPED | OUTPATIENT
Start: 2025-03-18 | End: 2025-03-23

## 2025-03-18 NOTE — TELEPHONE ENCOUNTER
Pt would like to know if you can send in rx for sinus infection- sore throat, eye pain, sinus pain, stuffy runny nose, swollen glands,cough, she had flu dx 2 weeks ago.   [facility]

## 2025-03-30 LAB
ALBUMIN SERPL-MCNC: 4.7 G/DL (ref 3.6–5.1)
ALP SERPL-CCNC: 82 U/L (ref 37–153)
ALT SERPL-CCNC: 27 U/L (ref 6–29)
ANION GAP SERPL CALCULATED.4IONS-SCNC: 10 MMOL/L (CALC) (ref 7–17)
AST SERPL-CCNC: 18 U/L (ref 10–35)
BILIRUB SERPL-MCNC: 0.6 MG/DL (ref 0.2–1.2)
BUN SERPL-MCNC: 15 MG/DL (ref 7–25)
CALCIUM SERPL-MCNC: 9.5 MG/DL (ref 8.6–10.4)
CHLORIDE SERPL-SCNC: 105 MMOL/L (ref 98–110)
CHOLEST SERPL-MCNC: 128 MG/DL
CHOLEST/HDLC SERPL: 3 (CALC)
CO2 SERPL-SCNC: 27 MMOL/L (ref 20–32)
CREAT SERPL-MCNC: 0.72 MG/DL (ref 0.5–1.03)
EGFRCR SERPLBLD CKD-EPI 2021: 96 ML/MIN/1.73M2
ERYTHROCYTE [DISTWIDTH] IN BLOOD BY AUTOMATED COUNT: 13 % (ref 11–15)
EST. AVERAGE GLUCOSE BLD GHB EST-MCNC: 217 MG/DL
EST. AVERAGE GLUCOSE BLD GHB EST-SCNC: 12 MMOL/L
GLUCOSE SERPL-MCNC: 161 MG/DL (ref 65–99)
HBA1C MFR BLD: 9.2 % OF TOTAL HGB
HCT VFR BLD AUTO: 46.8 % (ref 35–45)
HDLC SERPL-MCNC: 43 MG/DL
HGB BLD-MCNC: 15.3 G/DL (ref 11.7–15.5)
LDLC SERPL CALC-MCNC: 63 MG/DL (CALC)
MCH RBC QN AUTO: 28.3 PG (ref 27–33)
MCHC RBC AUTO-ENTMCNC: 32.7 G/DL (ref 32–36)
MCV RBC AUTO: 86.7 FL (ref 80–100)
NONHDLC SERPL-MCNC: 85 MG/DL (CALC)
PLATELET # BLD AUTO: 364 THOUSAND/UL (ref 140–400)
PMV BLD REES-ECKER: 10.5 FL (ref 7.5–12.5)
POTASSIUM SERPL-SCNC: 4.2 MMOL/L (ref 3.5–5.3)
PROT SERPL-MCNC: 6.8 G/DL (ref 6.1–8.1)
PYRIDOXAL PHOS SERPL-MCNC: 15 NG/ML (ref 2.1–21.7)
RBC # BLD AUTO: 5.4 MILLION/UL (ref 3.8–5.1)
SODIUM SERPL-SCNC: 142 MMOL/L (ref 135–146)
TRIGL SERPL-MCNC: 134 MG/DL
TSH SERPL-ACNC: 1.76 MIU/L (ref 0.4–4.5)
VIT B12 SERPL-MCNC: 539 PG/ML (ref 200–1100)
WBC # BLD AUTO: 7.7 THOUSAND/UL (ref 3.8–10.8)

## 2025-04-01 ENCOUNTER — HOSPITAL ENCOUNTER (OUTPATIENT)
Dept: RADIOLOGY | Facility: CLINIC | Age: 60
Discharge: HOME | End: 2025-04-01
Payer: MEDICARE

## 2025-04-01 ENCOUNTER — APPOINTMENT (OUTPATIENT)
Dept: PRIMARY CARE | Facility: CLINIC | Age: 60
End: 2025-04-01
Payer: COMMERCIAL

## 2025-04-01 VITALS
BODY MASS INDEX: 31.36 KG/M2 | OXYGEN SATURATION: 97 % | HEART RATE: 68 BPM | HEIGHT: 63 IN | WEIGHT: 177 LBS | SYSTOLIC BLOOD PRESSURE: 128 MMHG | DIASTOLIC BLOOD PRESSURE: 84 MMHG

## 2025-04-01 DIAGNOSIS — M54.2 NECK PAIN: ICD-10-CM

## 2025-04-01 DIAGNOSIS — E11.9 TYPE 2 DIABETES MELLITUS WITHOUT COMPLICATION, UNSPECIFIED WHETHER LONG TERM INSULIN USE: ICD-10-CM

## 2025-04-01 DIAGNOSIS — E78.5 HYPERLIPIDEMIA, UNSPECIFIED HYPERLIPIDEMIA TYPE: ICD-10-CM

## 2025-04-01 DIAGNOSIS — M54.41 CHRONIC BILATERAL LOW BACK PAIN WITH BILATERAL SCIATICA: ICD-10-CM

## 2025-04-01 DIAGNOSIS — K21.9 GASTROESOPHAGEAL REFLUX DISEASE WITHOUT ESOPHAGITIS: Primary | ICD-10-CM

## 2025-04-01 DIAGNOSIS — G25.81 RESTLESS LEG SYNDROME: ICD-10-CM

## 2025-04-01 DIAGNOSIS — F41.9 ANXIETY: ICD-10-CM

## 2025-04-01 DIAGNOSIS — G89.29 CHRONIC BILATERAL LOW BACK PAIN WITH BILATERAL SCIATICA: ICD-10-CM

## 2025-04-01 DIAGNOSIS — M54.42 CHRONIC BILATERAL LOW BACK PAIN WITH BILATERAL SCIATICA: ICD-10-CM

## 2025-04-01 PROCEDURE — 72040 X-RAY EXAM NECK SPINE 2-3 VW: CPT | Performed by: RADIOLOGY

## 2025-04-01 PROCEDURE — 99214 OFFICE O/P EST MOD 30 MIN: CPT

## 2025-04-01 PROCEDURE — 1036F TOBACCO NON-USER: CPT

## 2025-04-01 PROCEDURE — 3079F DIAST BP 80-89 MM HG: CPT

## 2025-04-01 PROCEDURE — 3008F BODY MASS INDEX DOCD: CPT

## 2025-04-01 PROCEDURE — 72040 X-RAY EXAM NECK SPINE 2-3 VW: CPT

## 2025-04-01 PROCEDURE — 3074F SYST BP LT 130 MM HG: CPT

## 2025-04-01 RX ORDER — BLOOD-GLUCOSE SENSOR
EACH MISCELLANEOUS
Qty: 6 EACH | Refills: 3 | Status: SHIPPED | OUTPATIENT
Start: 2025-04-01

## 2025-04-01 RX ORDER — LANCETS 33 GAUGE
1 EACH MISCELLANEOUS 2 TIMES DAILY
Qty: 100 EACH | Refills: 1 | Status: SHIPPED | OUTPATIENT
Start: 2025-04-01 | End: 2026-04-01

## 2025-04-01 RX ORDER — IBUPROFEN 200 MG
1 CAPSULE ORAL 2 TIMES DAILY
Qty: 200 STRIP | Refills: 3 | Status: SHIPPED | OUTPATIENT
Start: 2025-04-01 | End: 2026-04-01

## 2025-04-01 ASSESSMENT — ENCOUNTER SYMPTOMS
CARDIOVASCULAR NEGATIVE: 1
CONSTITUTIONAL NEGATIVE: 1
GASTROINTESTINAL NEGATIVE: 1
RESPIRATORY NEGATIVE: 1

## 2025-04-01 NOTE — PROGRESS NOTES
"Subjective   Patient ID: Hallie Demarco is a 59 y.o. female who presents for pt here for med check  (Pt having issues with foot and leg pain ).    HPI   DM2: A1C 9.2 last check. Compliant with metformin, had trouble getting Jardiance d/t cost, but not new insurance so should be no issue, no SE. Trial of Trulicity and Ozempic and SE of significant nausea. Eye doctor annually.  GERD: Stable on pantoprazole, no SE.  HYPERLIPIDEMIA: Lipids WNL last check. Compliant with statin, no SE.  BL HIP/BACK PAIN/BL LE NEUROPATHY: Fall on ice 2018, still w/residual pain/symptoms. Following with pain mgmt, receives injections. Has seen neurosurgeon Dr. Wright and does not think that neuropathy symptoms are from spine, EMG unremarkable June 2023. Dr. Wright thinks possible restless leg. Endorses nightly leg spasms and tingling with some mild burning BL LE, getting into hot bath helps, Requip 2mg has been helpful, does not completely resolve symptoms. Lyrica only somewhat effective. Following with pain mgmt, injections are somewhat helpful.  ANXIETY/DEPRESSION: Compliant w/sertraline, no SE, stable.  EDEMA: BL LE edema, chronic, compression stockings prn.     Hx of tx by Dr. Faria in [place] ([facility]) for non-RA factor positive RA. Then was told by another rheumatologist in [place] which said she did not have RA.     Following with chiro for neck pain, needs fresh Xray.     Dermatology, sees Dejan at [facility].  Hx of cystocele, s/p hysterectomy. [facility] health with [facility].  Hx of vocal cord cancer, follows with riaz Azevedo. No further follow up.  Mammogram good 2024, due 2025.  Colonoscopy good 2015, due again 2025.    Review of Systems   Constitutional: Negative.    Respiratory: Negative.     Cardiovascular: Negative.    Gastrointestinal: Negative.        Objective   /84   Pulse 68   Ht 1.6 m (5' 3\")   Wt 80.3 kg (177 lb)   SpO2 97%   BMI 31.35 kg/m²     Physical Exam  Constitutional:       " General: She is not in acute distress.     Appearance: Normal appearance. She is not ill-appearing.   HENT:      Head: Normocephalic and atraumatic.   Eyes:      Extraocular Movements: Extraocular movements intact.      Conjunctiva/sclera: Conjunctivae normal.   Cardiovascular:      Rate and Rhythm: Normal rate.   Pulmonary:      Effort: Pulmonary effort is normal.   Abdominal:      General: There is no distension.   Musculoskeletal:         General: Normal range of motion.      Cervical back: Normal range of motion.   Skin:     General: Skin is warm and dry.   Neurological:      General: No focal deficit present.      Mental Status: She is alert and oriented to person, place, and time.   Psychiatric:         Mood and Affect: Mood normal.         Behavior: Behavior normal.         Thought Content: Thought content normal.         Judgment: Judgment normal.         Assessment/Plan        Increase Jardiance back up to 25mg daily.  CGM ordered.  Neck Xray today.  No other medication changes today.  Follow up 3 months with A1C prior.

## 2025-04-28 ENCOUNTER — TELEPHONE (OUTPATIENT)
Dept: PRIMARY CARE | Facility: CLINIC | Age: 60
End: 2025-04-28
Payer: MEDICARE

## 2025-04-28 NOTE — TELEPHONE ENCOUNTER
Message from patient that she had an xray done on neck 4/1/25.  Wants a copy to take to chiropractor.  Please call her

## 2025-05-27 DIAGNOSIS — E11.9 TYPE 2 DIABETES MELLITUS WITHOUT COMPLICATION, UNSPECIFIED WHETHER LONG TERM INSULIN USE: ICD-10-CM

## 2025-06-09 ENCOUNTER — OFFICE VISIT (OUTPATIENT)
Dept: PAIN MEDICINE | Facility: CLINIC | Age: 60
End: 2025-06-09
Payer: MEDICARE

## 2025-06-09 VITALS
RESPIRATION RATE: 16 BRPM | HEART RATE: 80 BPM | SYSTOLIC BLOOD PRESSURE: 137 MMHG | WEIGHT: 179 LBS | DIASTOLIC BLOOD PRESSURE: 76 MMHG | BODY MASS INDEX: 31.71 KG/M2

## 2025-06-09 DIAGNOSIS — M54.16 LUMBAR NEURITIS: Primary | ICD-10-CM

## 2025-06-09 PROCEDURE — 99213 OFFICE O/P EST LOW 20 MIN: CPT

## 2025-06-09 RX ORDER — PREGABALIN 100 MG/1
100 CAPSULE ORAL 2 TIMES DAILY
Qty: 60 CAPSULE | Refills: 5 | Status: SHIPPED | OUTPATIENT
Start: 2025-06-09

## 2025-06-09 ASSESSMENT — ENCOUNTER SYMPTOMS
FACIAL ASYMMETRY: 0
ADENOPATHY: 0
BRUISES/BLEEDS EASILY: 0
ARTHRALGIAS: 0
WHEEZING: 0
MYALGIAS: 1
DYSPHORIC MOOD: 0
ENDOCRINE NEGATIVE: 1
EYES NEGATIVE: 1
WEAKNESS: 0
CONSTITUTIONAL NEGATIVE: 1
COUGH: 0
BACK PAIN: 1
ALLERGIC/IMMUNOLOGIC NEGATIVE: 1
PALPITATIONS: 0
SHORTNESS OF BREATH: 0
LIGHT-HEADEDNESS: 0

## 2025-06-09 NOTE — PROGRESS NOTES
Subjective   Patient ID: Hallie Demarco is a 59 y.o. female who presents for Med Management (FOLLOW UP ON LYRICA, PATIENT STATES SHE IS STABLE ON THE CURRENT DOSE, SHE HAS ONGOING LOWER BACK PAIN WITH PAIN INTO HER LEGS AND FEET, SHE AMBULATES WITH A ROLATOR FOR SUPPORT, SHE NOTES WEAKNESS IN HER LEGS AND BALANCE ISSUES,NO RECENT FALLS, ).SHE TAKES TYLENOL OR IBUPROFEN, CHIROPRACTIC ADJUSTMENTS PRN, TENS UNIT, OTC PAIN RELIEF CREAMS, PAIN SCORE 3/10, ZULY=58%, COMM=2, DEP NO , SMOKING NO  Lucia Warner, CMA 06/09/25 10:08 AM     The patient is a 59-year-old female presents today for a 6-month follow-up appointment for medication management.  She currently rates her pain 3/10 across her lower back and into bilateral legs all the way to her feet.  She also notes some pain and tingling in her feet due to her diabetes, saying her sugars have been a little high lately, but in general she has been working hard to get them under control.  We have her on pregabalin 100 mg twice a day.  She describes an occasional brain fog feeling, but says it is tolerable for the relief she obtains from the medication.  She uses Tylenol or ibuprofen as needed and does do chiropractic adjustments as needed as well.  She also uses a TENS unit and over-the-counter pain relief creams, and all of these help bring her pain to a manageable level.  She thinks she would be open to injections at a later time if she continues to get her sugar under control, but for now would like to maintain on her current regimen, and does not want to make any changes.        Review of Systems   Constitutional: Negative.    HENT: Negative.     Eyes: Negative.    Respiratory:  Negative for cough, shortness of breath and wheezing.    Cardiovascular:  Negative for chest pain, palpitations and leg swelling.   Endocrine: Negative.    Genitourinary: Negative.    Musculoskeletal:  Positive for back pain and myalgias. Negative for arthralgias.   Skin: Negative.     Allergic/Immunologic: Negative.    Neurological:  Negative for facial asymmetry, weakness and light-headedness.   Hematological:  Negative for adenopathy. Does not bruise/bleed easily.   Psychiatric/Behavioral:  Negative for dysphoric mood and suicidal ideas.        Objective   Physical Exam  Constitutional:       General: She is not in acute distress.     Appearance: Normal appearance.   HENT:      Head: Normocephalic.      Mouth/Throat:      Mouth: Mucous membranes are moist.   Eyes:      Extraocular Movements: Extraocular movements intact.   Cardiovascular:      Rate and Rhythm: Normal rate and regular rhythm.      Pulses: Normal pulses.      Heart sounds: Normal heart sounds. No murmur heard.     No friction rub. No gallop.   Pulmonary:      Effort: Pulmonary effort is normal.      Breath sounds: Normal breath sounds. No wheezing, rhonchi or rales.   Abdominal:      General: Abdomen is flat.      Palpations: Abdomen is soft.   Musculoskeletal:      Cervical back: Normal range of motion.      Right lower leg: No edema.      Left lower leg: No edema.      Comments: Ambulates with a rollator  Strength 5/5 BLE   Lymphadenopathy:      Cervical: No cervical adenopathy.   Skin:     General: Skin is warm and dry.   Neurological:      General: No focal deficit present.      Mental Status: She is alert and oriented to person, place, and time. Mental status is at baseline.   Psychiatric:         Mood and Affect: Mood normal.         Behavior: Behavior normal.         Assessment/Plan   Diagnoses and all orders for this visit:  Lumbar neuritis  -     pregabalin (Lyrica) 100 mg capsule; Take 1 capsule (100 mg) by mouth 2 times a day.       The patient is a 59-year-old female with a past medical history significant for the above-mentioned problem. She is requesting refill of her pregabalin 100 mg twice a day.  PDMP reviewed, Rx sent.  Otherwise, she does not have any questions or concerns about her pain at this time and  would like to maintain on current regimen.  She would like to follow-up with us in 6 months, follow-up clinic sooner if needed.

## 2025-06-10 ENCOUNTER — APPOINTMENT (OUTPATIENT)
Dept: PAIN MEDICINE | Facility: CLINIC | Age: 60
End: 2025-06-10
Payer: MEDICARE

## 2025-06-25 ENCOUNTER — TELEPHONE (OUTPATIENT)
Dept: PRIMARY CARE | Facility: CLINIC | Age: 60
End: 2025-06-25
Payer: MEDICARE

## 2025-06-25 DIAGNOSIS — R39.9 UTI SYMPTOMS: Primary | ICD-10-CM

## 2025-06-25 RX ORDER — CEPHALEXIN 500 MG/1
500 CAPSULE ORAL 2 TIMES DAILY
Qty: 10 CAPSULE | Refills: 0 | Status: SHIPPED | OUTPATIENT
Start: 2025-06-25 | End: 2025-06-30

## 2025-06-25 NOTE — TELEPHONE ENCOUNTER
Sent Keflex for her, has allergy to Macrobid, Bactrim, Levaquin.  Recommended UA when she returns to town if still symptomatic

## 2025-06-25 NOTE — TELEPHONE ENCOUNTER
Patient called in and spoke with Rowena. Patient stated she is out of town in [place] and think's she has a UTI. She is having frequency, urgency and burning after urination. She would like to know if you would be willing to send her in an antibiotic to take. She did not state when she would be back local but she gave me the pharmacy she would like it sent to. I updated this in her chart already. Please advise if you'd like to do this or if you would like me to forward this to maría.

## 2025-06-25 NOTE — TELEPHONE ENCOUNTER
Let her know I sent in antibiotic for treatment.  Will talk with her PCP tomorrow.  However, if she  still having symptoms after this medication she will need to get a urinalysis when she is back in town.

## 2025-07-02 LAB
EST. AVERAGE GLUCOSE BLD GHB EST-MCNC: 186 MG/DL
EST. AVERAGE GLUCOSE BLD GHB EST-SCNC: 10.3 MMOL/L
HBA1C MFR BLD: 8.1 %

## 2025-07-07 ENCOUNTER — TELEPHONE (OUTPATIENT)
Dept: PRIMARY CARE | Facility: CLINIC | Age: 60
End: 2025-07-07

## 2025-07-07 ENCOUNTER — APPOINTMENT (OUTPATIENT)
Dept: PRIMARY CARE | Facility: CLINIC | Age: 60
End: 2025-07-07
Payer: MEDICARE

## 2025-07-07 VITALS
BODY MASS INDEX: 30.83 KG/M2 | HEIGHT: 63 IN | WEIGHT: 174 LBS | SYSTOLIC BLOOD PRESSURE: 128 MMHG | DIASTOLIC BLOOD PRESSURE: 80 MMHG | HEART RATE: 74 BPM | OXYGEN SATURATION: 97 %

## 2025-07-07 DIAGNOSIS — Z12.31 SCREENING MAMMOGRAM FOR BREAST CANCER: ICD-10-CM

## 2025-07-07 DIAGNOSIS — K21.9 GASTROESOPHAGEAL REFLUX DISEASE WITHOUT ESOPHAGITIS: ICD-10-CM

## 2025-07-07 DIAGNOSIS — G25.81 RESTLESS LEG SYNDROME: ICD-10-CM

## 2025-07-07 DIAGNOSIS — F41.9 ANXIETY: ICD-10-CM

## 2025-07-07 DIAGNOSIS — E11.9 TYPE 2 DIABETES MELLITUS WITHOUT COMPLICATION, UNSPECIFIED WHETHER LONG TERM INSULIN USE: ICD-10-CM

## 2025-07-07 DIAGNOSIS — Z00.00 ROUTINE GENERAL MEDICAL EXAMINATION AT HEALTH CARE FACILITY: Primary | ICD-10-CM

## 2025-07-07 DIAGNOSIS — E78.5 HYPERLIPIDEMIA, UNSPECIFIED HYPERLIPIDEMIA TYPE: ICD-10-CM

## 2025-07-07 DIAGNOSIS — Z12.11 COLON CANCER SCREENING: ICD-10-CM

## 2025-07-07 PROCEDURE — 99214 OFFICE O/P EST MOD 30 MIN: CPT

## 2025-07-07 PROCEDURE — 3008F BODY MASS INDEX DOCD: CPT

## 2025-07-07 PROCEDURE — 1036F TOBACCO NON-USER: CPT

## 2025-07-07 PROCEDURE — 3079F DIAST BP 80-89 MM HG: CPT

## 2025-07-07 PROCEDURE — G0439 PPPS, SUBSEQ VISIT: HCPCS

## 2025-07-07 PROCEDURE — 3074F SYST BP LT 130 MM HG: CPT

## 2025-07-07 RX ORDER — ROPINIROLE 2 MG/1
2 TABLET, FILM COATED ORAL NIGHTLY
Qty: 90 TABLET | Refills: 3 | Status: SHIPPED | OUTPATIENT
Start: 2025-07-07 | End: 2026-07-07

## 2025-07-07 RX ORDER — BLOOD-GLUCOSE SENSOR
EACH MISCELLANEOUS
Qty: 6 EACH | Refills: 3 | Status: SHIPPED | OUTPATIENT
Start: 2025-07-07

## 2025-07-07 RX ORDER — METFORMIN HYDROCHLORIDE 500 MG/1
1000 TABLET, EXTENDED RELEASE ORAL DAILY
Qty: 180 TABLET | Refills: 3 | Status: SHIPPED | OUTPATIENT
Start: 2025-07-07 | End: 2026-07-07

## 2025-07-07 RX ORDER — ROSUVASTATIN CALCIUM 5 MG/1
5 TABLET, COATED ORAL DAILY
Qty: 90 TABLET | Refills: 3 | Status: SHIPPED | OUTPATIENT
Start: 2025-07-07 | End: 2026-07-07

## 2025-07-07 RX ORDER — TIRZEPATIDE 2.5 MG/.5ML
2.5 INJECTION, SOLUTION SUBCUTANEOUS WEEKLY
Qty: 4 PEN | Refills: 0 | Status: SHIPPED | OUTPATIENT
Start: 2025-07-07

## 2025-07-07 RX ORDER — SERTRALINE HYDROCHLORIDE 25 MG/1
25 TABLET, FILM COATED ORAL DAILY
Qty: 90 TABLET | Refills: 3 | Status: SHIPPED | OUTPATIENT
Start: 2025-07-07 | End: 2026-07-07

## 2025-07-07 RX ORDER — PANTOPRAZOLE SODIUM 40 MG/1
40 TABLET, DELAYED RELEASE ORAL DAILY
Qty: 90 TABLET | Refills: 3 | Status: SHIPPED | OUTPATIENT
Start: 2025-07-07 | End: 2026-07-07

## 2025-07-07 RX ORDER — LANCETS 33 GAUGE
1 EACH MISCELLANEOUS 2 TIMES DAILY
Qty: 100 EACH | Refills: 1 | Status: SHIPPED | OUTPATIENT
Start: 2025-07-07 | End: 2026-07-07

## 2025-07-07 ASSESSMENT — ACTIVITIES OF DAILY LIVING (ADL)
BATHING: INDEPENDENT
TAKING_MEDICATION: INDEPENDENT
MANAGING_FINANCES: INDEPENDENT
DOING_HOUSEWORK: INDEPENDENT
DRESSING: INDEPENDENT
GROCERY_SHOPPING: INDEPENDENT

## 2025-07-07 ASSESSMENT — ENCOUNTER SYMPTOMS
RESPIRATORY NEGATIVE: 1
GASTROINTESTINAL NEGATIVE: 1
CONSTITUTIONAL NEGATIVE: 1
CARDIOVASCULAR NEGATIVE: 1

## 2025-07-07 NOTE — ASSESSMENT & PLAN NOTE
Orders:    empagliflozin (Jardiance) 25 mg tablet; Take 1 tablet (25 mg) by mouth once daily.    FreeStyle Omega 3 Sensor device; Use as directed.    lancets 33 gauge misc; 1 each 2 times a day.    metFORMIN  mg 24 hr tablet; Take 2 tablets (1,000 mg) by mouth once daily.    tirzepatide (Mounjaro) 2.5 mg/0.5 mL pen injector; Inject 2.5 mg under the skin 1 (one) time per week.    Hemoglobin A1C; Future    Albumin-Creatinine Ratio, Urine Random; Future

## 2025-07-07 NOTE — PROGRESS NOTES
Subjective   Reason for Visit: Hallie Demarco is an 59 y.o. female here for a Medicare Wellness visit.     Past Medical, Surgical, and Family History reviewed and updated in chart.    Reviewed all medications by prescribing practitioner or clinical pharmacist (such as prescriptions, OTCs, herbal therapies and supplements) and documented in the medical record.    HPI  DM2: A1C 8.1 last check. Compliant with metformin and Jardiance. Trial of Trulicity and Ozempic and SE of significant nausea. Eye doctor annually.  GERD: Stable on pantoprazole, no SE.  HYPERLIPIDEMIA: Lipids WNL last check. Compliant with statin, no SE.  BL HIP/BACK PAIN/BL LE NEUROPATHY: Fall on ice 2018, still w/residual pain/symptoms. Following with pain mgmt, receives injections. Has seen neurosurgeon Dr. Wright and does not think that neuropathy symptoms are from spine, EMG unremarkable June 2023. Dr. Wright thinks possible restless leg. Endorses nightly leg spasms and tingling with some mild burning BL LE, getting into hot bath helps, Requip 2mg has been helpful, does not completely resolve symptoms. Lyrica only somewhat effective. Following with pain mgmt, injections are somewhat helpful.  ANXIETY/DEPRESSION: Compliant w/sertraline, no SE, stable.  EDEMA: BL LE edema, chronic, compression stockings prn.     Hx of tx by Dr. Faria in [place] ([facility] [place]) for non-RA factor positive RA. Then was told by another rheumatologist in [place] which said she did not have RA.      Following with chiro for neck pain.     Dermatology, sees Dejan at [facility].  Hx of cystocele, s/p hysterectomy. [facility]'s health with [facility].  Hx of vocal cord cancer, follows with riaz Azevedo. No further follow up.  Mammogram good 2024, due 2025.  Colonoscopy good 2015, due again 2025.    Patient Care Team:  Rosales Zamarripa PA-C as PCP - General  Valentino Carter MD as PCP - [employer] PCP     Review of Systems   Constitutional: Negative.   "  Respiratory: Negative.     Cardiovascular: Negative.    Gastrointestinal: Negative.        Objective   Vitals:  /80   Pulse 74   Ht 1.6 m (5' 3\")   Wt 78.9 kg (174 lb)   SpO2 97%   BMI 30.82 kg/m²       Physical Exam  Constitutional:       General: She is not in acute distress.     Appearance: Normal appearance. She is not ill-appearing.   HENT:      Head: Normocephalic and atraumatic.   Eyes:      Extraocular Movements: Extraocular movements intact.      Conjunctiva/sclera: Conjunctivae normal.   Cardiovascular:      Rate and Rhythm: Normal rate.   Pulmonary:      Effort: Pulmonary effort is normal.   Abdominal:      General: There is no distension.   Musculoskeletal:         General: Normal range of motion.      Cervical back: Normal range of motion.   Skin:     General: Skin is warm and dry.   Neurological:      General: No focal deficit present.      Mental Status: She is alert and oriented to person, place, and time.   Psychiatric:         Mood and Affect: Mood normal.         Behavior: Behavior normal.         Thought Content: Thought content normal.         Judgment: Judgment normal.         Assessment & Plan  Type 2 diabetes mellitus without complication, unspecified whether long term insulin use    Orders:    empagliflozin (Jardiance) 25 mg tablet; Take 1 tablet (25 mg) by mouth once daily.    FreeStyle Omega 3 Sensor device; Use as directed.    lancets 33 gauge misc; 1 each 2 times a day.    metFORMIN  mg 24 hr tablet; Take 2 tablets (1,000 mg) by mouth once daily.    tirzepatide (Mounjaro) 2.5 mg/0.5 mL pen injector; Inject 2.5 mg under the skin 1 (one) time per week.    Hemoglobin A1C; Future    Albumin-Creatinine Ratio, Urine Random; Future    Gastroesophageal reflux disease without esophagitis    Orders:    pantoprazole (ProtoNix) 40 mg EC tablet; Take 1 tablet (40 mg) by mouth once daily.    Restless leg syndrome    Orders:    rOPINIRole (Requip) 2 mg tablet; Take 1 tablet (2 mg) by " mouth once daily at bedtime.    Hyperlipidemia, unspecified hyperlipidemia type    Orders:    rosuvastatin (Crestor) 5 mg tablet; Take 1 tablet (5 mg) by mouth once daily.    Anxiety    Orders:    sertraline (Zoloft) 25 mg tablet; Take 1 tablet (25 mg) by mouth once daily.    Routine general medical examination at health care facility         Colon cancer screening    Orders:    Cologuard® colon cancer screening; Future    Screening mammogram for breast cancer    Orders:    BI mammo bilateral screening tomosynthesis; Future            Rx Mounjaro.  No other medication changes today.  Cologuard ordered.  Mammo ordered.  Follow up 3 months with A1C and urine albumin prior.

## 2025-07-07 NOTE — ASSESSMENT & PLAN NOTE
Orders:    rOPINIRole (Requip) 2 mg tablet; Take 1 tablet (2 mg) by mouth once daily at bedtime.

## 2025-07-07 NOTE — TELEPHONE ENCOUNTER
"Message from [facility]  Got Rx today for Mounjaro and said \"4\"  unspecified in qty  Need to clarify if it's 4 pens=1 box or 4ml =2 boxes.  Guessing 4 pens but need to verify    Please call [facility]  "

## 2025-07-21 ENCOUNTER — APPOINTMENT (OUTPATIENT)
Dept: RADIOLOGY | Facility: CLINIC | Age: 60
End: 2025-07-21
Payer: MEDICARE

## 2025-07-28 ENCOUNTER — TELEPHONE (OUTPATIENT)
Dept: PRIMARY CARE | Facility: CLINIC | Age: 60
End: 2025-07-28
Payer: MEDICARE

## 2025-07-28 DIAGNOSIS — E11.9 TYPE 2 DIABETES MELLITUS WITHOUT COMPLICATION, UNSPECIFIED WHETHER LONG TERM INSULIN USE: ICD-10-CM

## 2025-07-28 RX ORDER — TIRZEPATIDE 2.5 MG/.5ML
INJECTION, SOLUTION SUBCUTANEOUS
Qty: 2 ML | Refills: 0 | OUTPATIENT
Start: 2025-07-28

## 2025-07-30 ENCOUNTER — HOSPITAL ENCOUNTER (OUTPATIENT)
Dept: RADIOLOGY | Facility: CLINIC | Age: 60
Discharge: HOME | End: 2025-07-30
Payer: MEDICARE

## 2025-07-30 VITALS — HEIGHT: 63 IN | WEIGHT: 174 LBS | BODY MASS INDEX: 30.83 KG/M2

## 2025-07-30 DIAGNOSIS — Z12.31 SCREENING MAMMOGRAM FOR BREAST CANCER: ICD-10-CM

## 2025-07-30 PROCEDURE — 77067 SCR MAMMO BI INCL CAD: CPT | Performed by: RADIOLOGY

## 2025-07-30 PROCEDURE — 77063 BREAST TOMOSYNTHESIS BI: CPT

## 2025-07-30 PROCEDURE — 77063 BREAST TOMOSYNTHESIS BI: CPT | Performed by: RADIOLOGY

## 2025-07-31 DIAGNOSIS — E11.9 TYPE 2 DIABETES MELLITUS WITHOUT COMPLICATION, UNSPECIFIED WHETHER LONG TERM INSULIN USE: ICD-10-CM

## 2025-08-01 RX ORDER — TIRZEPATIDE 2.5 MG/.5ML
2.5 INJECTION, SOLUTION SUBCUTANEOUS WEEKLY
Qty: 4 PEN | Refills: 0 | Status: SHIPPED | OUTPATIENT
Start: 2025-08-01

## 2025-08-02 LAB — NONINV COLON CA DNA+OCC BLD SCRN STL QL: NEGATIVE

## 2025-08-04 ENCOUNTER — TELEPHONE (OUTPATIENT)
Dept: PRIMARY CARE | Facility: CLINIC | Age: 60
End: 2025-08-04
Payer: MEDICARE

## 2025-08-04 ENCOUNTER — TELEPHONE (OUTPATIENT)
Dept: PAIN MEDICINE | Facility: CLINIC | Age: 60
End: 2025-08-04
Payer: MEDICARE

## 2025-08-04 DIAGNOSIS — E11.9 TYPE 2 DIABETES MELLITUS WITHOUT COMPLICATION, UNSPECIFIED WHETHER LONG TERM INSULIN USE: ICD-10-CM

## 2025-08-04 DIAGNOSIS — G25.81 RESTLESS LEG SYNDROME: ICD-10-CM

## 2025-08-04 DIAGNOSIS — K21.9 GASTROESOPHAGEAL REFLUX DISEASE WITHOUT ESOPHAGITIS: ICD-10-CM

## 2025-08-04 DIAGNOSIS — E78.5 HYPERLIPIDEMIA, UNSPECIFIED HYPERLIPIDEMIA TYPE: ICD-10-CM

## 2025-08-04 DIAGNOSIS — F41.9 ANXIETY: ICD-10-CM

## 2025-08-04 DIAGNOSIS — M54.16 LUMBAR NEURITIS: ICD-10-CM

## 2025-08-04 RX ORDER — PANTOPRAZOLE SODIUM 40 MG/1
40 TABLET, DELAYED RELEASE ORAL DAILY
Qty: 90 TABLET | Refills: 3 | Status: SHIPPED | OUTPATIENT
Start: 2025-08-04 | End: 2026-08-04

## 2025-08-04 RX ORDER — BLOOD-GLUCOSE SENSOR
EACH MISCELLANEOUS
Qty: 6 EACH | Refills: 3 | Status: SHIPPED | OUTPATIENT
Start: 2025-08-04

## 2025-08-04 RX ORDER — BLOOD-GLUCOSE SENSOR
EACH MISCELLANEOUS
COMMUNITY
End: 2025-08-04 | Stop reason: SDUPTHER

## 2025-08-04 RX ORDER — ROPINIROLE 2 MG/1
2 TABLET, FILM COATED ORAL NIGHTLY
Qty: 90 TABLET | Refills: 3 | Status: SHIPPED | OUTPATIENT
Start: 2025-08-04 | End: 2026-08-04

## 2025-08-04 RX ORDER — METFORMIN HYDROCHLORIDE 500 MG/1
1000 TABLET, EXTENDED RELEASE ORAL DAILY
Qty: 180 TABLET | Refills: 3 | Status: SHIPPED | OUTPATIENT
Start: 2025-08-04 | End: 2026-08-04

## 2025-08-04 RX ORDER — ROSUVASTATIN CALCIUM 5 MG/1
5 TABLET, COATED ORAL DAILY
Qty: 90 TABLET | Refills: 3 | Status: SHIPPED | OUTPATIENT
Start: 2025-08-04 | End: 2026-08-04

## 2025-08-04 RX ORDER — BLOOD-GLUCOSE SENSOR
1 EACH MISCELLANEOUS
Qty: 6 EACH | Refills: 3 | Status: SHIPPED | OUTPATIENT
Start: 2025-08-04 | End: 2026-08-04

## 2025-08-04 RX ORDER — PREGABALIN 100 MG/1
100 CAPSULE ORAL 2 TIMES DAILY
Qty: 60 CAPSULE | Refills: 5 | Status: SHIPPED | OUTPATIENT
Start: 2025-08-04

## 2025-08-04 RX ORDER — SERTRALINE HYDROCHLORIDE 25 MG/1
25 TABLET, FILM COATED ORAL DAILY
Qty: 90 TABLET | Refills: 3 | Status: SHIPPED | OUTPATIENT
Start: 2025-08-04 | End: 2026-08-04

## 2025-08-04 NOTE — TELEPHONE ENCOUNTER
Message from [facility]  Got rx today for the Freestyle Omega 3 sensors.  It is too early to fill it.  They will be discontinuing the Moega 3 9/3/25 and replaced with the Omega 3 Plus. Those sensors last for 15 days.   Can they get a Rx for the Omega 3 Plus now, that way she can get it filled and won't have any trouble getting that refilled when it's time.

## 2025-08-04 NOTE — TELEPHONE ENCOUNTER
Pc to patient and she needs Metformin, Pantoprazole, Ropinirole, Rosuvastatin, Sertraline, Jardiance and her Omega 3 sensors sent to [facility]

## 2025-10-07 ENCOUNTER — APPOINTMENT (OUTPATIENT)
Dept: PRIMARY CARE | Facility: CLINIC | Age: 60
End: 2025-10-07
Payer: MEDICARE